# Patient Record
Sex: FEMALE | Race: WHITE | Employment: OTHER | URBAN - METROPOLITAN AREA
[De-identification: names, ages, dates, MRNs, and addresses within clinical notes are randomized per-mention and may not be internally consistent; named-entity substitution may affect disease eponyms.]

---

## 2020-06-11 PROBLEM — M81.0 SENILE OSTEOPOROSIS: Status: ACTIVE | Noted: 2020-06-11

## 2020-06-11 RX ORDER — SODIUM CHLORIDE 9 MG/ML
INJECTION, SOLUTION INTRAVENOUS CONTINUOUS
Status: CANCELLED | OUTPATIENT
Start: 2020-06-11

## 2020-06-11 RX ORDER — DIPHENHYDRAMINE HYDROCHLORIDE 50 MG/ML
50 INJECTION INTRAMUSCULAR; INTRAVENOUS ONCE
Status: CANCELLED | OUTPATIENT
Start: 2020-06-11

## 2020-06-11 RX ORDER — ZOLEDRONIC ACID 5 MG/100ML
5 INJECTION, SOLUTION INTRAVENOUS ONCE
Status: CANCELLED | OUTPATIENT
Start: 2020-06-11

## 2020-06-11 RX ORDER — EPINEPHRINE 1 MG/ML
0.3 INJECTION, SOLUTION, CONCENTRATE INTRAVENOUS PRN
Status: CANCELLED | OUTPATIENT
Start: 2020-06-11

## 2020-06-11 RX ORDER — HEPARIN SODIUM (PORCINE) LOCK FLUSH IV SOLN 100 UNIT/ML 100 UNIT/ML
500 SOLUTION INTRAVENOUS PRN
Status: CANCELLED | OUTPATIENT
Start: 2020-06-11

## 2020-06-11 RX ORDER — SODIUM CHLORIDE 0.9 % (FLUSH) 0.9 %
5 SYRINGE (ML) INJECTION PRN
Status: CANCELLED | OUTPATIENT
Start: 2020-06-11

## 2020-06-11 RX ORDER — SODIUM CHLORIDE 0.9 % (FLUSH) 0.9 %
10 SYRINGE (ML) INJECTION PRN
Status: CANCELLED | OUTPATIENT
Start: 2020-06-11

## 2020-06-11 RX ORDER — METHYLPREDNISOLONE SODIUM SUCCINATE 125 MG/2ML
125 INJECTION, POWDER, LYOPHILIZED, FOR SOLUTION INTRAMUSCULAR; INTRAVENOUS ONCE
Status: CANCELLED | OUTPATIENT
Start: 2020-06-11

## 2020-06-11 RX ORDER — SODIUM CHLORIDE 9 MG/ML
INJECTION, SOLUTION INTRAVENOUS CONTINUOUS
Status: CANCELLED
Start: 2020-06-11

## 2020-07-28 NOTE — PROGRESS NOTES
Called and spoke with patient instructed her we received and ok to use her labs for her infusion on Thursday so no additional lab work is needed and instructed her on hydrating well before she comes in 2 glasses water she verbalizes understanding. home

## 2020-07-28 NOTE — PROGRESS NOTES
Received a referral for Reclast along with labs. Called to hospital Pharmacist Shital Minor to have creatinine clearance calculated. Patient is 79years old, height 5'5\", weight 164lb , gender female, creatinine level 0.61. Pharmacist did calculation and I was told creatinine clearance is 56.

## 2020-07-30 ENCOUNTER — HOSPITAL ENCOUNTER (OUTPATIENT)
Dept: INFUSION THERAPY | Age: 80
Setting detail: INFUSION SERIES
Discharge: HOME | End: 2020-07-30
Payer: MEDICARE

## 2020-07-30 VITALS
WEIGHT: 162 LBS | HEIGHT: 65 IN | HEART RATE: 60 BPM | OXYGEN SATURATION: 93 % | RESPIRATION RATE: 16 BRPM | DIASTOLIC BLOOD PRESSURE: 65 MMHG | SYSTOLIC BLOOD PRESSURE: 124 MMHG | TEMPERATURE: 97.2 F | BODY MASS INDEX: 26.99 KG/M2

## 2020-07-30 DIAGNOSIS — M81.0 OSTEOPOROSIS, UNSPECIFIED OSTEOPOROSIS TYPE, UNSPECIFIED PATHOLOGICAL FRACTURE PRESENCE: Primary | ICD-10-CM

## 2020-07-30 PROCEDURE — 2580000003 HC RX 258: Performed by: INTERNAL MEDICINE

## 2020-07-30 PROCEDURE — 96365 THER/PROPH/DIAG IV INF INIT: CPT

## 2020-07-30 PROCEDURE — 6360000002 HC RX W HCPCS: Performed by: INTERNAL MEDICINE

## 2020-07-30 RX ORDER — SODIUM CHLORIDE 0.9 % (FLUSH) 0.9 %
10 SYRINGE (ML) INJECTION PRN
Status: CANCELLED | OUTPATIENT
Start: 2020-07-30

## 2020-07-30 RX ORDER — SODIUM CHLORIDE 9 MG/ML
INJECTION, SOLUTION INTRAVENOUS CONTINUOUS
Status: DISCONTINUED | OUTPATIENT
Start: 2020-07-30 | End: 2020-07-30 | Stop reason: ALTCHOICE

## 2020-07-30 RX ORDER — SODIUM CHLORIDE 9 MG/ML
INJECTION, SOLUTION INTRAVENOUS CONTINUOUS
Status: CANCELLED | OUTPATIENT
Start: 2020-07-30

## 2020-07-30 RX ORDER — EPINEPHRINE 1 MG/ML
0.3 INJECTION, SOLUTION, CONCENTRATE INTRAVENOUS PRN
Status: CANCELLED | OUTPATIENT
Start: 2020-07-30

## 2020-07-30 RX ORDER — ZOLEDRONIC ACID 5 MG/100ML
5 INJECTION, SOLUTION INTRAVENOUS ONCE
Status: CANCELLED | OUTPATIENT
Start: 2020-07-30

## 2020-07-30 RX ORDER — DIPHENHYDRAMINE HYDROCHLORIDE 50 MG/ML
50 INJECTION INTRAMUSCULAR; INTRAVENOUS ONCE
Status: CANCELLED | OUTPATIENT
Start: 2020-07-30

## 2020-07-30 RX ORDER — METHYLPREDNISOLONE SODIUM SUCCINATE 125 MG/2ML
125 INJECTION, POWDER, LYOPHILIZED, FOR SOLUTION INTRAMUSCULAR; INTRAVENOUS ONCE
Status: CANCELLED | OUTPATIENT
Start: 2020-07-30

## 2020-07-30 RX ORDER — SODIUM CHLORIDE 0.9 % (FLUSH) 0.9 %
5 SYRINGE (ML) INJECTION PRN
Status: CANCELLED | OUTPATIENT
Start: 2020-07-30

## 2020-07-30 RX ORDER — ZOLEDRONIC ACID 5 MG/100ML
5 INJECTION, SOLUTION INTRAVENOUS ONCE
Status: COMPLETED | OUTPATIENT
Start: 2020-07-30 | End: 2020-07-30

## 2020-07-30 RX ORDER — HEPARIN SODIUM (PORCINE) LOCK FLUSH IV SOLN 100 UNIT/ML 100 UNIT/ML
500 SOLUTION INTRAVENOUS PRN
Status: CANCELLED | OUTPATIENT
Start: 2020-07-30

## 2020-07-30 RX ORDER — SODIUM CHLORIDE 0.9 % (FLUSH) 0.9 %
10 SYRINGE (ML) INJECTION PRN
Status: DISCONTINUED | OUTPATIENT
Start: 2020-07-30 | End: 2020-07-30 | Stop reason: ALTCHOICE

## 2020-07-30 RX ORDER — SODIUM CHLORIDE 9 MG/ML
INJECTION, SOLUTION INTRAVENOUS CONTINUOUS
Status: CANCELLED
Start: 2020-07-30

## 2020-07-30 RX ADMIN — ZOLEDRONIC ACID 5 MG: 5 INJECTION, SOLUTION INTRAVENOUS at 10:31

## 2020-07-30 RX ADMIN — SODIUM CHLORIDE: 9 INJECTION, SOLUTION INTRAVENOUS at 10:30

## 2020-07-30 RX ADMIN — SODIUM CHLORIDE, PRESERVATIVE FREE 10 ML: 5 INJECTION INTRAVENOUS at 10:27

## 2020-07-30 RX ADMIN — SODIUM CHLORIDE, PRESERVATIVE FREE 10 ML: 5 INJECTION INTRAVENOUS at 10:50

## 2020-07-30 NOTE — PROGRESS NOTES
Tolerated infusion well. Therapy plan reviewed with patient. Verbalizes understanding. Reviewed AVS with patient, reviewed medication information, verbalizes good knowledge of current plan, and has no signs or symptoms to report at this time. Declines copy of AVS. VERBALIZES COMPLIANCE WITH HYDRATION BEFORE SHE CAME AND SHE WILL MAKE SURE TO DRINK WELL TODAY. DECLINES TO MAKE NEXT APPOINTMENT SAID SHE WILL SEE THE DOCTOR NEXT YEAR AND THEN RESCHEDULE.

## 2021-10-18 ENCOUNTER — APPOINTMENT (OUTPATIENT)
Dept: CT IMAGING | Age: 81
DRG: 069 | End: 2021-10-18
Payer: MEDICARE

## 2021-10-18 ENCOUNTER — HOSPITAL ENCOUNTER (INPATIENT)
Age: 81
LOS: 2 days | Discharge: HOME | DRG: 069 | End: 2021-10-21
Attending: STUDENT IN AN ORGANIZED HEALTH CARE EDUCATION/TRAINING PROGRAM | Admitting: INTERNAL MEDICINE
Payer: MEDICARE

## 2021-10-18 DIAGNOSIS — R41.0 CONFUSION: Primary | ICD-10-CM

## 2021-10-18 DIAGNOSIS — G45.9 TIA (TRANSIENT ISCHEMIC ATTACK): ICD-10-CM

## 2021-10-18 LAB
ALBUMIN SERPL-MCNC: 5 G/DL (ref 3.5–5.2)
ALP BLD-CCNC: 66 U/L (ref 35–104)
ALT SERPL-CCNC: 8 U/L (ref 0–32)
ANION GAP SERPL CALCULATED.3IONS-SCNC: 8 MMOL/L (ref 7–16)
AST SERPL-CCNC: 20 U/L (ref 0–31)
BACTERIA: ABNORMAL /HPF
BASOPHILS ABSOLUTE: 0.03 E9/L (ref 0–0.2)
BASOPHILS RELATIVE PERCENT: 0.5 % (ref 0–2)
BILIRUB SERPL-MCNC: 0.3 MG/DL (ref 0–1.2)
BILIRUBIN URINE: NEGATIVE
BLOOD, URINE: ABNORMAL
BUN BLDV-MCNC: 16 MG/DL (ref 6–23)
CALCIUM SERPL-MCNC: 9.7 MG/DL (ref 8.6–10.2)
CHLORIDE BLD-SCNC: 105 MMOL/L (ref 98–107)
CLARITY: CLEAR
CO2: 27 MMOL/L (ref 22–29)
COLOR: YELLOW
CREAT SERPL-MCNC: 0.7 MG/DL (ref 0.5–1)
EOSINOPHILS ABSOLUTE: 0.13 E9/L (ref 0.05–0.5)
EOSINOPHILS RELATIVE PERCENT: 2.1 % (ref 0–6)
GFR AFRICAN AMERICAN: >60
GFR NON-AFRICAN AMERICAN: >60 ML/MIN/1.73
GLUCOSE BLD-MCNC: 115 MG/DL (ref 74–99)
GLUCOSE URINE: NEGATIVE MG/DL
HCT VFR BLD CALC: 43.8 % (ref 34–48)
HEMOGLOBIN: 14 G/DL (ref 11.5–15.5)
IMMATURE GRANULOCYTES #: 0.02 E9/L
IMMATURE GRANULOCYTES %: 0.3 % (ref 0–5)
KETONES, URINE: NEGATIVE MG/DL
LEUKOCYTE ESTERASE, URINE: NEGATIVE
LYMPHOCYTES ABSOLUTE: 1.48 E9/L (ref 1.5–4)
LYMPHOCYTES RELATIVE PERCENT: 24.1 % (ref 20–42)
MAGNESIUM: 2 MG/DL (ref 1.6–2.6)
MCH RBC QN AUTO: 30.2 PG (ref 26–35)
MCHC RBC AUTO-ENTMCNC: 32 % (ref 32–34.5)
MCV RBC AUTO: 94.4 FL (ref 80–99.9)
MONOCYTES ABSOLUTE: 0.44 E9/L (ref 0.1–0.95)
MONOCYTES RELATIVE PERCENT: 7.2 % (ref 2–12)
NEUTROPHILS ABSOLUTE: 4.04 E9/L (ref 1.8–7.3)
NEUTROPHILS RELATIVE PERCENT: 65.8 % (ref 43–80)
NITRITE, URINE: NEGATIVE
PDW BLD-RTO: 14.4 FL (ref 11.5–15)
PH UA: 6 (ref 5–9)
PLATELET # BLD: 148 E9/L (ref 130–450)
PMV BLD AUTO: 10.3 FL (ref 7–12)
POTASSIUM SERPL-SCNC: 4.2 MMOL/L (ref 3.5–5)
PROTEIN UA: NEGATIVE MG/DL
RBC # BLD: 4.64 E12/L (ref 3.5–5.5)
RBC UA: ABNORMAL /HPF (ref 0–2)
SODIUM BLD-SCNC: 140 MMOL/L (ref 132–146)
SPECIFIC GRAVITY UA: <=1.005 (ref 1–1.03)
TOTAL PROTEIN: 7.4 G/DL (ref 6.4–8.3)
TROPONIN, HIGH SENSITIVITY: 10 NG/L (ref 0–9)
UROBILINOGEN, URINE: 0.2 E.U./DL
WBC # BLD: 6.1 E9/L (ref 4.5–11.5)
WBC UA: ABNORMAL /HPF (ref 0–5)

## 2021-10-18 PROCEDURE — 80053 COMPREHEN METABOLIC PANEL: CPT

## 2021-10-18 PROCEDURE — 99285 EMERGENCY DEPT VISIT HI MDM: CPT

## 2021-10-18 PROCEDURE — 84484 ASSAY OF TROPONIN QUANT: CPT

## 2021-10-18 PROCEDURE — 85025 COMPLETE CBC W/AUTO DIFF WBC: CPT

## 2021-10-18 PROCEDURE — 36415 COLL VENOUS BLD VENIPUNCTURE: CPT

## 2021-10-18 PROCEDURE — 83735 ASSAY OF MAGNESIUM: CPT

## 2021-10-18 PROCEDURE — 93005 ELECTROCARDIOGRAM TRACING: CPT | Performed by: STUDENT IN AN ORGANIZED HEALTH CARE EDUCATION/TRAINING PROGRAM

## 2021-10-18 PROCEDURE — 81001 URINALYSIS AUTO W/SCOPE: CPT

## 2021-10-18 PROCEDURE — 70498 CT ANGIOGRAPHY NECK: CPT

## 2021-10-18 PROCEDURE — 70496 CT ANGIOGRAPHY HEAD: CPT

## 2021-10-18 RX ORDER — SODIUM CHLORIDE 0.9 % (FLUSH) 0.9 %
10 SYRINGE (ML) INJECTION PRN
Status: COMPLETED | OUTPATIENT
Start: 2021-10-18 | End: 2021-10-19

## 2021-10-18 RX ORDER — SODIUM CHLORIDE 0.9 % (FLUSH) 0.9 %
5-40 SYRINGE (ML) INJECTION 2 TIMES DAILY
Status: DISCONTINUED | OUTPATIENT
Start: 2021-10-18 | End: 2021-10-18

## 2021-10-19 PROBLEM — F32.A ANXIETY AND DEPRESSION: Status: ACTIVE | Noted: 2021-10-19

## 2021-10-19 PROBLEM — G45.9 TIA (TRANSIENT ISCHEMIC ATTACK): Status: ACTIVE | Noted: 2021-10-19

## 2021-10-19 PROBLEM — F41.9 ANXIETY AND DEPRESSION: Status: ACTIVE | Noted: 2021-10-19

## 2021-10-19 PROBLEM — Z86.79 HISTORY OF ATRIAL FIBRILLATION: Chronic | Status: ACTIVE | Noted: 2021-10-19

## 2021-10-19 PROBLEM — I48.91 ATRIAL FIBRILLATION (HCC): Status: ACTIVE | Noted: 2021-10-19

## 2021-10-19 PROBLEM — E55.9 VITAMIN D DEFICIENCY: Status: ACTIVE | Noted: 2021-10-19

## 2021-10-19 PROBLEM — I66.09 MIDDLE CEREBRAL ARTERY STENOSIS: Status: ACTIVE | Noted: 2021-10-19

## 2021-10-19 PROBLEM — Z95.5 HISTORY OF CORONARY ARTERY STENT PLACEMENT: Chronic | Status: ACTIVE | Noted: 2021-10-19

## 2021-10-19 LAB
ANION GAP SERPL CALCULATED.3IONS-SCNC: 15 MMOL/L (ref 7–16)
BASOPHILS ABSOLUTE: 0.04 E9/L (ref 0–0.2)
BASOPHILS RELATIVE PERCENT: 0.7 % (ref 0–2)
BUN BLDV-MCNC: 11 MG/DL (ref 6–23)
CALCIUM SERPL-MCNC: 8.8 MG/DL (ref 8.6–10.2)
CHLORIDE BLD-SCNC: 108 MMOL/L (ref 98–107)
CHOLESTEROL, TOTAL: 228 MG/DL (ref 0–199)
CO2: 20 MMOL/L (ref 22–29)
CREAT SERPL-MCNC: 0.5 MG/DL (ref 0.5–1)
EKG ATRIAL RATE: 84 BPM
EKG P-R INTERVAL: 234 MS
EKG Q-T INTERVAL: 418 MS
EKG QRS DURATION: 92 MS
EKG QTC CALCULATION (BAZETT): 431 MS
EKG R AXIS: 14 DEGREES
EKG T AXIS: 38 DEGREES
EKG VENTRICULAR RATE: 64 BPM
EOSINOPHILS ABSOLUTE: 0.1 E9/L (ref 0.05–0.5)
EOSINOPHILS RELATIVE PERCENT: 1.7 % (ref 0–6)
GFR AFRICAN AMERICAN: >60
GFR NON-AFRICAN AMERICAN: >60 ML/MIN/1.73
GLUCOSE BLD-MCNC: 176 MG/DL (ref 74–99)
HBA1C MFR BLD: 5.7 % (ref 4–5.6)
HCT VFR BLD CALC: 41.3 % (ref 34–48)
HDLC SERPL-MCNC: 54 MG/DL
HEMOGLOBIN: 13.2 G/DL (ref 11.5–15.5)
IMMATURE GRANULOCYTES #: 0.02 E9/L
IMMATURE GRANULOCYTES %: 0.3 % (ref 0–5)
LDL CHOLESTEROL CALCULATED: 148 MG/DL (ref 0–99)
LYMPHOCYTES ABSOLUTE: 1.41 E9/L (ref 1.5–4)
LYMPHOCYTES RELATIVE PERCENT: 23.6 % (ref 20–42)
MCH RBC QN AUTO: 30 PG (ref 26–35)
MCHC RBC AUTO-ENTMCNC: 32 % (ref 32–34.5)
MCV RBC AUTO: 93.9 FL (ref 80–99.9)
MONOCYTES ABSOLUTE: 0.36 E9/L (ref 0.1–0.95)
MONOCYTES RELATIVE PERCENT: 6 % (ref 2–12)
NEUTROPHILS ABSOLUTE: 4.05 E9/L (ref 1.8–7.3)
NEUTROPHILS RELATIVE PERCENT: 67.7 % (ref 43–80)
PDW BLD-RTO: 14.6 FL (ref 11.5–15)
PLATELET # BLD: 144 E9/L (ref 130–450)
PMV BLD AUTO: 10.8 FL (ref 7–12)
POTASSIUM REFLEX MAGNESIUM: 4.1 MMOL/L (ref 3.5–5)
RBC # BLD: 4.4 E12/L (ref 3.5–5.5)
SODIUM BLD-SCNC: 143 MMOL/L (ref 132–146)
TRIGL SERPL-MCNC: 131 MG/DL (ref 0–149)
TROPONIN, HIGH SENSITIVITY: 8 NG/L (ref 0–9)
TROPONIN, HIGH SENSITIVITY: 8 NG/L (ref 0–9)
VLDLC SERPL CALC-MCNC: 26 MG/DL
WBC # BLD: 6 E9/L (ref 4.5–11.5)

## 2021-10-19 PROCEDURE — 6370000000 HC RX 637 (ALT 250 FOR IP): Performed by: STUDENT IN AN ORGANIZED HEALTH CARE EDUCATION/TRAINING PROGRAM

## 2021-10-19 PROCEDURE — G0378 HOSPITAL OBSERVATION PER HR: HCPCS

## 2021-10-19 PROCEDURE — 97165 OT EVAL LOW COMPLEX 30 MIN: CPT

## 2021-10-19 PROCEDURE — 6370000000 HC RX 637 (ALT 250 FOR IP): Performed by: INTERNAL MEDICINE

## 2021-10-19 PROCEDURE — 70496 CT ANGIOGRAPHY HEAD: CPT

## 2021-10-19 PROCEDURE — 6360000004 HC RX CONTRAST MEDICATION: Performed by: STUDENT IN AN ORGANIZED HEALTH CARE EDUCATION/TRAINING PROGRAM

## 2021-10-19 PROCEDURE — 36415 COLL VENOUS BLD VENIPUNCTURE: CPT

## 2021-10-19 PROCEDURE — 99222 1ST HOSP IP/OBS MODERATE 55: CPT | Performed by: PSYCHIATRY & NEUROLOGY

## 2021-10-19 PROCEDURE — 80048 BASIC METABOLIC PNL TOTAL CA: CPT

## 2021-10-19 PROCEDURE — 6370000000 HC RX 637 (ALT 250 FOR IP): Performed by: NURSE PRACTITIONER

## 2021-10-19 PROCEDURE — 85025 COMPLETE CBC W/AUTO DIFF WBC: CPT

## 2021-10-19 PROCEDURE — 2580000003 HC RX 258: Performed by: NURSE PRACTITIONER

## 2021-10-19 PROCEDURE — 84484 ASSAY OF TROPONIN QUANT: CPT

## 2021-10-19 PROCEDURE — 2580000003 HC RX 258: Performed by: STUDENT IN AN ORGANIZED HEALTH CARE EDUCATION/TRAINING PROGRAM

## 2021-10-19 PROCEDURE — 1200000000 HC SEMI PRIVATE

## 2021-10-19 PROCEDURE — 80061 LIPID PANEL: CPT

## 2021-10-19 PROCEDURE — 97161 PT EVAL LOW COMPLEX 20 MIN: CPT

## 2021-10-19 PROCEDURE — 70498 CT ANGIOGRAPHY NECK: CPT

## 2021-10-19 PROCEDURE — 93010 ELECTROCARDIOGRAM REPORT: CPT | Performed by: INTERNAL MEDICINE

## 2021-10-19 PROCEDURE — 97530 THERAPEUTIC ACTIVITIES: CPT

## 2021-10-19 PROCEDURE — 83036 HEMOGLOBIN GLYCOSYLATED A1C: CPT

## 2021-10-19 PROCEDURE — 6370000000 HC RX 637 (ALT 250 FOR IP): Performed by: EMERGENCY MEDICINE

## 2021-10-19 RX ORDER — ASPIRIN 81 MG/1
81 TABLET, CHEWABLE ORAL DAILY
Status: DISCONTINUED | OUTPATIENT
Start: 2021-10-19 | End: 2021-10-19

## 2021-10-19 RX ORDER — ACETAMINOPHEN 325 MG/1
650 TABLET ORAL EVERY 6 HOURS PRN
Status: DISCONTINUED | OUTPATIENT
Start: 2021-10-19 | End: 2021-10-21 | Stop reason: HOSPADM

## 2021-10-19 RX ORDER — ACETAMINOPHEN 650 MG/1
650 SUPPOSITORY RECTAL EVERY 6 HOURS PRN
Status: DISCONTINUED | OUTPATIENT
Start: 2021-10-19 | End: 2021-10-21 | Stop reason: HOSPADM

## 2021-10-19 RX ORDER — VITAMIN B COMPLEX
1000 TABLET ORAL DAILY
Status: DISCONTINUED | OUTPATIENT
Start: 2021-10-19 | End: 2021-10-21 | Stop reason: HOSPADM

## 2021-10-19 RX ORDER — CLOPIDOGREL BISULFATE 75 MG/1
300 TABLET ORAL ONCE
Status: COMPLETED | OUTPATIENT
Start: 2021-10-19 | End: 2021-10-19

## 2021-10-19 RX ORDER — POLYETHYLENE GLYCOL 3350 17 G/17G
17 POWDER, FOR SOLUTION ORAL DAILY PRN
Status: DISCONTINUED | OUTPATIENT
Start: 2021-10-19 | End: 2021-10-21 | Stop reason: HOSPADM

## 2021-10-19 RX ORDER — SODIUM CHLORIDE 0.9 % (FLUSH) 0.9 %
5-40 SYRINGE (ML) INJECTION PRN
Status: DISCONTINUED | OUTPATIENT
Start: 2021-10-19 | End: 2021-10-21 | Stop reason: HOSPADM

## 2021-10-19 RX ORDER — EZETIMIBE 10 MG/1
10 TABLET ORAL NIGHTLY
Status: DISCONTINUED | OUTPATIENT
Start: 2021-10-19 | End: 2021-10-21 | Stop reason: HOSPADM

## 2021-10-19 RX ORDER — ASPIRIN 81 MG/1
81 TABLET, CHEWABLE ORAL DAILY
Status: DISCONTINUED | OUTPATIENT
Start: 2021-10-20 | End: 2021-10-19

## 2021-10-19 RX ORDER — CALCIUM CARBONATE 200(500)MG
500 TABLET,CHEWABLE ORAL DAILY
Status: DISCONTINUED | OUTPATIENT
Start: 2021-10-19 | End: 2021-10-21 | Stop reason: HOSPADM

## 2021-10-19 RX ORDER — SODIUM CHLORIDE 0.9 % (FLUSH) 0.9 %
5-40 SYRINGE (ML) INJECTION EVERY 12 HOURS SCHEDULED
Status: DISCONTINUED | OUTPATIENT
Start: 2021-10-19 | End: 2021-10-21 | Stop reason: HOSPADM

## 2021-10-19 RX ORDER — DOFETILIDE 0.12 MG/1
125 CAPSULE ORAL 2 TIMES DAILY
Status: DISCONTINUED | OUTPATIENT
Start: 2021-10-19 | End: 2021-10-21 | Stop reason: HOSPADM

## 2021-10-19 RX ORDER — ASPIRIN 325 MG
325 TABLET ORAL ONCE
Status: COMPLETED | OUTPATIENT
Start: 2021-10-19 | End: 2021-10-19

## 2021-10-19 RX ORDER — SODIUM CHLORIDE 9 MG/ML
25 INJECTION, SOLUTION INTRAVENOUS PRN
Status: DISCONTINUED | OUTPATIENT
Start: 2021-10-19 | End: 2021-10-21 | Stop reason: HOSPADM

## 2021-10-19 RX ORDER — ACETAMINOPHEN 500 MG
1000 TABLET ORAL ONCE
Status: COMPLETED | OUTPATIENT
Start: 2021-10-19 | End: 2021-10-19

## 2021-10-19 RX ORDER — VENLAFAXINE HYDROCHLORIDE 37.5 MG/1
37.5 CAPSULE, EXTENDED RELEASE ORAL DAILY
Status: DISCONTINUED | OUTPATIENT
Start: 2021-10-19 | End: 2021-10-21 | Stop reason: HOSPADM

## 2021-10-19 RX ORDER — PROCHLORPERAZINE EDISYLATE 5 MG/ML
5 INJECTION INTRAMUSCULAR; INTRAVENOUS EVERY 6 HOURS PRN
Status: DISCONTINUED | OUTPATIENT
Start: 2021-10-19 | End: 2021-10-21 | Stop reason: HOSPADM

## 2021-10-19 RX ORDER — ROSUVASTATIN CALCIUM 5 MG/1
5 TABLET, COATED ORAL NIGHTLY
Status: DISCONTINUED | OUTPATIENT
Start: 2021-10-19 | End: 2021-10-21 | Stop reason: HOSPADM

## 2021-10-19 RX ADMIN — Medication 5 ML: at 21:05

## 2021-10-19 RX ADMIN — CLOPIDOGREL BISULFATE 300 MG: 75 TABLET ORAL at 02:03

## 2021-10-19 RX ADMIN — VENLAFAXINE HYDROCHLORIDE 37.5 MG: 37.5 CAPSULE, EXTENDED RELEASE ORAL at 18:22

## 2021-10-19 RX ADMIN — SODIUM CHLORIDE, PRESERVATIVE FREE 10 ML: 5 INJECTION INTRAVENOUS at 00:10

## 2021-10-19 RX ADMIN — Medication 10 ML: at 13:03

## 2021-10-19 RX ADMIN — DOFETILIDE 125 MCG: 0.12 CAPSULE ORAL at 21:04

## 2021-10-19 RX ADMIN — EZETIMIBE 10 MG: 10 TABLET ORAL at 21:04

## 2021-10-19 RX ADMIN — IOPAMIDOL 75 ML: 755 INJECTION, SOLUTION INTRAVENOUS at 00:10

## 2021-10-19 RX ADMIN — DOFETILIDE 125 MCG: 0.12 CAPSULE ORAL at 13:03

## 2021-10-19 RX ADMIN — ROSUVASTATIN CALCIUM 5 MG: 5 TABLET, FILM COATED ORAL at 21:43

## 2021-10-19 RX ADMIN — CALCIUM CARBONATE 500 MG: 500 TABLET, CHEWABLE ORAL at 13:03

## 2021-10-19 RX ADMIN — Medication 1000 UNITS: at 13:03

## 2021-10-19 RX ADMIN — ASPIRIN 325 MG: 325 TABLET ORAL at 01:47

## 2021-10-19 RX ADMIN — ACETAMINOPHEN 1000 MG: 500 TABLET ORAL at 06:28

## 2021-10-19 RX ADMIN — APIXABAN 5 MG: 5 TABLET, FILM COATED ORAL at 21:04

## 2021-10-19 ASSESSMENT — PAIN SCALES - GENERAL
PAINLEVEL_OUTOF10: 0
PAINLEVEL_OUTOF10: 4

## 2021-10-19 NOTE — PROGRESS NOTES
Physical Therapy  Physical Therapy Initial Assessment     Name: Refugio Villar  : 1940  MRN: [id number]      Date of Service: 10/19/2021    Evaluating PT:  Serafina Brittle, PT, DPT  [id number]     Room #:  8957/8457-F  Diagnosis:  TIA (transient ischemic attack) [G45.9]  Confusion [R41.0]  PMHx/PSHx:  OA, Hyperlipidemia  Procedure/Surgery:  N/A  Precautions:  Falls, Needs : Alvaro  Equipment Needs:  TBD    SUBJECTIVE:    Pt lives with son in a 2 story home with 3 or 6 stairs to enter and one rail each. Bedroom and bathroom are on the 1st level. Pt ambulated with no AD PTA and was independent in all ADL. Family reports daughter/son-in-law live <10 minutes away and are able/willing to assist pt prn. OBJECTIVE:   Initial Evaluation  Date: 10/19 Treatment Short Term/ Long Term   Goals   AM-PAC 6 Clicks 36/83     Was pt agreeable to Eval/treatment? yes     Does pt have pain? Headache, unspecified     Bed Mobility  Rolling: SBA  Supine to sit: SBA  Sit to supine: SBA  Scooting: SBA  Rolling: Independent  Supine to sit: Independent  Sit to supine: Independent  Scooting: Independent   Transfers Sit to stand: SBA  Stand to sit: SBA  Stand pivot: NT  Sit to stand: Independent  Stand to sit:  Independent  Stand pivot: Independent   Ambulation   2x150 feet with no AD SBA  400 feet Supervision   Stair negotiation: ascended and descended  4 steps with 1 rail SBA  8 steps with 1 rail supervision   ROM BUE:  Per OT eval  BLE:  Full AROM     Strength BUE:  Per OT eval   BLE:  4/5 overall  Improve to 5/5 overall   Balance Sitting EOB:  SBA  Dynamic Standing:  SBA  Sitting EOB:  Independent  Dynamic Standing:  Independent     Pt is A & O x 3, limited due to possible expressive aphasia and language barrier  Sensation:  Pt denies numbness and tingling to extremities  Edema:  Unremarkable     Therapeutic Exercises:    Functional mobility    Patient education  Pt educated on PT role, safety during functional mobility, and prognosis of current illness. Patient response to education:   Pt verbalized understanding Pt demonstrated skill Pt requires further education in this area   yes yes reinforcement     ASSESSMENT:  Conditions Requiring Skilled Therapeutic Intervention:    []Decreased strength     []Decreased ROM  [x]Decreased functional mobility  [x]Decreased balance   [x]Decreased endurance   []Decreased posture  []Decreased sensation  [x]Decreased coordination   []Decreased vision  []Decreased safety awareness   []Increased pain       Comments:  Pt received recumbent in bed and agreeable to PT evaluation with OT collaboration. Son in law in room for visitation able to assist with translation for evaluation - no  used. Pt was difficult historian due to potential aphasia and language barrier. Pt supine<>sit EOB with light physical assist for safety only. Sit<>stand without AD or physical assistance outside of safety. Pt ambulated without AD into hallway SBA with shortened gait pattern, requiring verbal cues to guide back to room. Stair negotiation of 4 steps using one railing SBA no AD using step to pattern. Pt left with call button in reach, lines attached, and needs met.       Treatment:  Patient practiced and was instructed in the following treatment:     Bed mobility training - pt given verbal and tactile cues to facilitate proper sequencing and safety during rolling and supine>sit as well as provided with physical assistance to complete task    Sitting EOB for >2 minutes for upright tolerance, postural awareness and BLE ROM   STS transfer training - pt educated on proper hand and foot placement, safety and sequencing to safely complete sit<>stand transfers with hands on assistance to complete task safely    Gait training- pt was given verbal and tactile cues to facilitate safety/balance, navigation of enviornment during ambulation as well as provided with physical assistance to complete task.   Stair training - Pt educated on proper safety and sequencing during stair ascension and descension. Verbal cues were given to facilitate safety/balance and hands on assistance provided for balance and fall prevention. Pt's/ family goals   1. Return to son's home    Prognosis is good for reaching above PT goals. Patient and or family understand(s) diagnosis, prognosis, and plan of care. yes    PHYSICAL THERAPY PLAN OF CARE:    PT POC is established based on physician order and patient diagnosis     Referring provider/PT Order:    10/19/21 0845  PT eval and treat     April DELIA Stout - CNP       Diagnosis:  TIA (transient ischemic attack) [G45.9]  Confusion [R41.0]  Specific instructions for next treatment:  Progress transfer training, ambulation, and stair negotiation training. Current Treatment Recommendations:     [x] Strengthening to improve independence with functional mobility   [] ROM to improve independence with functional mobility   [x] Balance Training to improve static/dynamic balance and to reduce fall risk  [x] Endurance Training to improve activity tolerance during functional mobility   [x] Transfer Training to improve safety and independence with all functional transfers   [x] Gait Training to improve gait mechanics, endurance and asses need for appropriate assistive device  [x] Stair Training in preparation for safe discharge home and/or into the community   [] Positioning to prevent skin breakdown and contractures  [] Safety and Education Training   [] Patient/Caregiver Education   [] HEP  [] Other     PT long term treatment goals are located in above grid    Frequency of treatments: 2-5x/week x 1-2 weeks.     Time in  1030  Time out  1100    Total Treatment Time  15 minutes     Evaluation Time includes thorough review of current medical information, gathering information on past medical history/social history and prior level of function, completion of standardized

## 2021-10-19 NOTE — PROGRESS NOTES
Called and left voicemail for Dr. Buffy Graves regarding  a medication. Nikki Hammond wants to know why the pt is on 2.5 mg Eliquis. Awaiting call back.

## 2021-10-19 NOTE — ED PROVIDER NOTES
Department of Emergency Medicine   ED  Provider Note  Admit Date/RoomTime: 10/18/2021  9:54 PM  ED Room: 03/03          History of Present Illness:  10/18/21, Time: 11:36 PM EDT  Chief Complaint   Patient presents with    Altered Mental Status     family Yasmeen Reveles and son in law) went to house at 2000-  pt is AO X1 (Unusual)  last known well Saturday       Ann Washington is a 79 y.o. female presenting to the ED for Brief episode of confusion. Family reports this was at 8 PM.  It resolved quickly. It was completely resolved by the time the arrived to their mother's house. At present, the patient states that she feels her normal baseline state of health. Additionally, earlier in the day the patient notes an episode where she had trouble opening and closing her right eye. She states it was only her eye. She did not try to talk at this time. She denied any associated headache, nausea or vomiting or slurred speech at the time. Denies history of TIA or CVA in the past.  Patient is currently on anticoagulation with Eliquis for history of atrial fibrillation. Nothing makes her symptoms better or worse. At present, the patient is without complaint. Additionally, they note that the patient when they were speaking with her on the phone seem to not be speaking properly and they were having difficulty understanding her as well. The patient does not speak good [place]. However, they were speaking with her in her native language.     Review of Systems:  Constitutional: Denies fevers  Eyes: Denies blurry vision  ENT: Denies sore throat  Cardiovascular: Denies chest pain  Respiratory: Denies shortness of breath  Gastrointestinal: Denies abdominal pain  Genitourinary: Denies dysuria  Musculoskeletal: Denies myalgias  Integumentary: Denies rashes  Neurological: Denies headache    --------------------------------------------- PAST HISTORY ---------------------------------------------  Past Medical History:  has a past medical history of Arthritis, Heart attack ([facility] [address] and Hyperlipidemia. Past Surgical History:  has a past surgical history that includes Hysterectomy (9/26/79); Arm Debridement (1998); Mitral valve replacement (2008); Appendectomy (2009); pacemaker placement (2008); and shoulder surgery. Social History:  reports that she has never smoked. She has never used smokeless tobacco. She reports that she does not drink alcohol and does not use drugs. Family History: family history includes Breast Cancer in her sister. . Unless otherwise noted, family history is non contributory    The patients home medications have been reviewed. Allergies: Latex, Lipitor [atorvastatin], Tricor [fenofibrate], and Zocor [simvastatin]    I have reviewed the past medical history, past surgical history, social history, and family history    ---------------------------------------------------PHYSICAL EXAM--------------------------------------    Constitutional: Appears in no distress  Head: Normocephalic, atraumatic  Eyes: Non-icteric slcera, no conjunctival injection  ENT: Moist mucous membranes,  Neck: Trachea midline, no JVD  Respiratory: Nonlabored respirations. Lungs clear to auscultation bilaterally, no wheezes, rales, or rhonchi. Cardiovascular: Regular rate. Regular rhythm. No murmurs, no gallops, no rubs. Gastrointestinal: Abdomen Soft, Non tender, Non distended. No rebound tenderness, guarding, or rigidity. Extremities: No lower extremity edema  Genitourinary: No CVA tenderness, no suprapubic tenderness  Musculoskeletal: Moves all extremities, no deformity  Skin: Pink, warm, dry without rash. Neurologic: Alert, symmetric facies, no aphasia, neurologic: Pupils are equal and reactive to light. Extraocular eye movements intact. Sensation intact bilaterally. Symmetric facies. Tongue protrudes midline. No meningismus. 5 out of 5 symmetric strength of the upper and lower extremities.   Finger to nose testing is within normal limits. The patient has a normal gait. Alert and oriented. -------------------------------------------------- RESULTS -------------------------------------------------  I have personally reviewed all laboratory and imaging results for this patient. Results are listed below.      LABS: (Lab results interpreted by me)  Results for orders placed or performed during the hospital encounter of 10/18/21   Urinalysis with Microscopic   Result Value Ref Range    Color, UA Yellow Straw/Yellow    Clarity, UA Clear Clear    Glucose, Ur Negative Negative mg/dL    Bilirubin Urine Negative Negative    Ketones, Urine Negative Negative mg/dL    Specific Gravity, UA <=1.005 1.005 - 1.030    Blood, Urine SMALL (A) Negative    pH, UA 6.0 5.0 - 9.0    Protein, UA Negative Negative mg/dL    Urobilinogen, Urine 0.2 <2.0 E.U./dL    Nitrite, Urine Negative Negative    Leukocyte Esterase, Urine Negative Negative    WBC, UA NONE 0 - 5 /HPF    RBC, UA 0-1 0 - 2 /HPF    Bacteria, UA NONE SEEN None Seen /HPF   CBC Auto Differential   Result Value Ref Range    WBC 6.1 4.5 - 11.5 E9/L    RBC 4.64 3.50 - 5.50 E12/L    Hemoglobin 14.0 11.5 - 15.5 g/dL    Hematocrit 43.8 34.0 - 48.0 %    MCV 94.4 80.0 - 99.9 fL    MCH 30.2 26.0 - 35.0 pg    MCHC 32.0 32.0 - 34.5 %    RDW 14.4 11.5 - 15.0 fL    Platelets 971 940 - 326 E9/L    MPV 10.3 7.0 - 12.0 fL    Neutrophils % 65.8 43.0 - 80.0 %    Immature Granulocytes % 0.3 0.0 - 5.0 %    Lymphocytes % 24.1 20.0 - 42.0 %    Monocytes % 7.2 2.0 - 12.0 %    Eosinophils % 2.1 0.0 - 6.0 %    Basophils % 0.5 0.0 - 2.0 %    Neutrophils Absolute 4.04 1.80 - 7.30 E9/L    Immature Granulocytes # 0.02 E9/L    Lymphocytes Absolute 1.48 (L) 1.50 - 4.00 E9/L    Monocytes Absolute 0.44 0.10 - 0.95 E9/L    Eosinophils Absolute 0.13 0.05 - 0.50 E9/L    Basophils Absolute 0.03 0.00 - 0.20 E9/L   Comprehensive Metabolic Panel   Result Value Ref Range    Sodium 140 132 - 146 mmol/L    Potassium 4.2 3.5 - 5.0 mmol/L    Chloride 105 98 - 107 mmol/L    CO2 27 22 - 29 mmol/L    Anion Gap 8 7 - 16 mmol/L    Glucose 115 (H) 74 - 99 mg/dL    BUN 16 6 - 23 mg/dL    CREATININE 0.7 0.5 - 1.0 mg/dL    GFR Non-African American >60 >=60 mL/min/1.73    GFR African American >60     Calcium 9.7 8.6 - 10.2 mg/dL    Total Protein 7.4 6.4 - 8.3 g/dL    Albumin 5.0 3.5 - 5.2 g/dL    Total Bilirubin 0.3 0.0 - 1.2 mg/dL    Alkaline Phosphatase 66 35 - 104 U/L    ALT 8 0 - 32 U/L    AST 20 0 - 31 U/L   Troponin   Result Value Ref Range    Troponin, High Sensitivity 10 (H) 0 - 9 ng/L   Troponin   Result Value Ref Range    Troponin, High Sensitivity 8 0 - 9 ng/L   Magnesium   Result Value Ref Range    Magnesium 2.0 1.6 - 2.6 mg/dL   EKG 12 Lead   Result Value Ref Range    Ventricular Rate 64 BPM    Atrial Rate 84 BPM    P-R Interval 234 ms    QRS Duration 92 ms    Q-T Interval 418 ms    QTc Calculation (Bazett) 431 ms    R Axis 14 degrees    T Axis 38 degrees   ,       RADIOLOGY:  Interpreted by Radiologist unless otherwise specified  CTA HEAD W CONTRAST   Preliminary Result   1. No acute intracranial hemorrhage or mass effect. 2. 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 Focal calcified atherosclerotic plaque in the left middle   cerebral artery M1 segment. Otherwise, the bilateral middle cerebral artery   branches are patent. 3. No hemodynamically significant stenosis in the bilateral cervical internal   carotid arteries per NASCET criteria. Bilateral carotid bulb atherosclerotic   plaque. 4. Patent bilateral vertebral arteries. Follow-up brain MRI and/or conventional catheter angiogram may be helpful for   further evaluation. CTA NECK W CONTRAST   Preliminary Result   1. No acute intracranial hemorrhage or mass effect. 2. 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   Focal calcified atherosclerotic plaque in the left middle cerebral artery M1 segment. Otherwise, the bilateral middle cerebral artery   branches are patent. 3. No hemodynamically significant stenosis in the bilateral cervical internal   carotid arteries per NASCET criteria. Bilateral carotid bulb atherosclerotic   plaque. 4. Patent bilateral vertebral arteries. Follow-up brain MRI and/or conventional catheter angiogram may be helpful for   further evaluation.               ------------------------- NURSING NOTES AND VITALS REVIEWED ---------------------------   The nursing notes within the ED encounter and vital signs as below have been reviewed by myself  /88   Pulse 95   Temp 97.1 °F (36.2 °C) (Temporal)   Resp 16   LMP 09/10/1979   SpO2 97%     Oxygen Saturation Interpretation: Normal    The patients available past medical records and past encounters were reviewed. ------------------------------ ED COURSE/MEDICAL DECISION MAKING----------------------  Medications   aspirin tablet 325 mg (has no administration in time range)   iopamidol (ISOVUE-370) 76 % injection 75 mL (75 mLs IntraVENous Given 10/19/21 0010)   sodium chloride flush 0.9 % injection 10 mL (10 mLs IntraVENous Given 10/19/21 0010)       This patient's ED course included:a personal history and physicial examination and re-evaluation prior to disposition    This patient has remained hemodynamically stable during their ED course. Consultations:  Internal Medicine    Medical Decision Making:   Patient presents emergency department with episode of confusion as well as difficulty opening and closing her right eye for about 1/2-hour between 230 and 3 PM.    Vital signs are reviewed and interpreted by myself as within normal acceptable limits. History and physical examination finding consistent with multiple differential diagnosis considered including TIA, electrolyte abnormalities, ACS, urinary tract infection.   Work-up is initiated for this including a head CT and CTA head and neck.    EKG:  EKG is interpreted by myself as ventricular rate of 64 bpm there is a atrial paced rhythm with a pacer spike for every QRS complex. Castration is within normal limits at 92 ms QT/QTc is within normal limits at 418/431 ms. No ST segment elevations or depressions. No T wave inversions that would be concerning for acute ischemia or infarct. This tracing is interpreted by myself as atrial paced rhythm. Labs: Reviewed and within normal acceptable limits. Urinalysis negative for urinary tract infection. Headache, nasal congestion labs: Lab studies are unremarkable per    Imaging: CTA head and neck remarkable for high-grade stenosis bilateral MCA M1 segments. This may be chronic. Given the patient's symptomatology as well as altered mentation episode as well as inability to close her right eye for about 1/2-hour I feel this is concerning for TIA. We will transfer the [facility] to [facility] for neurologic evaluation. She is given aspirin here. Counseling: The emergency provider has spoken with the patient and discussed todays results, in addition to providing specific details for the plan of care and counseling regarding the diagnosis and prognosis. Questions are answered at this time and they are agreeable with the plan.     --------------------------------- IMPRESSION AND DISPOSITION ---------------------------------    IMPRESSION  1. Confusion    2. TIA (transient ischemic attack)        DISPOSITION  Disposition: Transfer to [place] [address]  Patient condition is stable    IDr. Eleazar, am the primary provider of record    Eleazar Barbosa DO  Emergency Medicine    NOTE: This report was transcribed using voice recognition software.  Every effort was made to ensure accuracy; however, inadvertent computerized transcription errors may be present         Kt Stevens, DO  10/19/21 1 [facility] Roger Madrigal, DO  10/19/21 Dwayne Connolly DO  10/19/21 0159

## 2021-10-19 NOTE — CONSULTS
[address]  Neurology Consult    Date:  10/19/2021  Patient Name:  Jessica Munguia  YOB: 1940  MRN: [id number]     PCP:  Terell Knight MD   Referring:  No ref. provider found      Chief Complaint: AMS , [facility] Saturday 10/16/21    History obtained from: EMR, patient     Assessment  Jessica Munguia is a 79 y.o.  female with PMHx of arthritis, HLD, Afib (since 2015, on eliquis), mitral regurgitation s/[ mitral valve repair in 2008 with subsequent permanent pacemaker insertion presenting for evaluation of confusion/AMS. Neurology consulted for TIA/ stenosis in CTA Head/Neck. Atrial fibrillation:[id number] Score: 5    Review of history, physical and imaging indicate this may be a TIA 2/2 high grade stenosis vs cardioembolic in origin. ABCD2 score 3 ( Low risk) . Imaging showed high-grade stenosis of the bilateral middle cerebral artery M1 segments may be chronic. Focal calcified atherosclerotic plaque in the left middlecerebral artery M1 segment. Otherwise, the bilateral middle cerebral artery branches are patent. No hemodynamically significant stenosis of carotid arteries in neck but bilateral carotid bulb atherosclerotic plaque noted. Plan  MRI cannot be done due to pacemaker    , would recommend aggressive LDL reduction due to significant atherosclerotic cerebrovascular disease. Previous concerns with myalgia noted as per family but will still recommend zetia and crestor to start now and then reassess as outpatient for myalgia recurrence.   Increase Eliquis to 5mg BID   Stop antiplatelets to reduce bleeding risk   Echo to complete stroke/TIA w/u, assess for cardioembolic source        History of Present Illness:  Jessica Munguia is a 79 y.o.  female with PMHx of arthritis, HLD, Afib (since 2015, on dofetilide+  eliquis), mitral regurgitation s/[ mitral valve repair in 2008 with subsequent permanent pacemaker insertion presenting for evaluation of confusion/AMS. Family went to see patient at [address] on 10/18. She was confused and AAOx1 as per family. Family additionally noted that her speech was difficult to understand on the phone. This resolved by the time EMS reached. Additionally earlier in the day, patient had trouble with right eye movements. She was then admitted for further evaluation. MRI could not be done as patient has incompatible pacemaker. On further interrogation, patient admits to difficulty opening the right eye in the afternoon yesterday and denies blurring of vision. This lasted for about an hour and then resolved by itself. As per son in law at bedside, when they called her in the evening she had abnormal speech and difficulty word finding . She sounded confused on the phone as per family. She was then brought in for further evaluation but her confusion had resolved. No fever, hemiparesis, LOC, visual symptoms, previous similar episodes. She does endorse headache that has been intermittent and present for years.  She has been compliant with her medications        Review of Systems:  Constitutional  Weight loss: no  Fever: no    Eyes  Double Vision: no  Visions loss: no    Ears, Nose, Mouth, and Throat  Difficulty swallowing: no    Cardiovascular  Chest Pain: no    Respiratory  Shortness of Breath: no    Gastrointestinal  Abdominal Pain: no    Genitourinary  Difficulty with Urination: no    Integumentary  Rash: no    Musculoskeletal  Back Pain: no    Neurological  Headaches: yes - intermittent   Weakness: no  Numbness: no  Seizures: no  Difficulty with Memory: no    Psychiatric  Anxiety: no  Depression: no  Other mental health issues: no      Medical History:   Past Medical History:   Diagnosis Date    Arthritis     osteoporosis and degenerative     Heart attack ([facility] [address] 2006    Hyperlipidemia         Surgical History:   Past Surgical History:   Procedure Laterality Date    APPENDECTOMY  2009   [address] HYSTERECTOMY  9/26/79    tv    MITRAL VALVE REPLACEMENT  2008    PACEMAKER PLACEMENT  2008    PGR with Dr Elizabeth Kingert De[employer]ers device 7/11/14    SHOULDER SURGERY      BENIGN BONE TUMOR        Family History:   Family History   Problem Relation Age of Onset    Breast Cancer Sister        -    Social History:  Social History     Tobacco Use    Smoking status: Never Smoker    Smokeless tobacco: Never Used   Substance Use Topics    Alcohol use: No    Drug use: No        Current Medications:      Current Facility-Administered Medications   Medication Dose Route Frequency Provider Last Rate Last Admin    sodium chloride flush 0.9 % injection 5-40 mL  5-40 mL IntraVENous 2 times per day April DELIA Stout CNP        sodium chloride flush 0.9 % injection 5-40 mL  5-40 mL IntraVENous PRN April DELIA Stout CNP        0.9 % sodium chloride infusion  25 mL IntraVENous PRN April DELIA Stout CNP        polyethylene glycol (GLYCOLAX) packet 17 g  17 g Oral Daily PRN April DELIA Stout CNP        acetaminophen (TYLENOL) tablet 650 mg  650 mg Oral Q6H PRN April DELIA Stout CNP        Or    acetaminophen (TYLENOL) suppository 650 mg  650 mg Rectal Q6H PRN April DELIA Stout CNP        apixaban (ELIQUIS) tablet 5 mg  5 mg Oral BID April DELIA Stout CNP        calcium carbonate tablet 600 mg  1 tablet Oral Daily April DELIA Stout CNP        dofetilide (TIKOSYN) capsule 125 mcg  125 mcg Oral BID April DELIA Stout CNP        venlafaxine (EFFEXOR XR) extended release capsule 37.5 mg  37.5 mg Oral Daily April DELIA Stout CNP        vitamin D (CHOLECALCIFEROL) tablet 1,000 Units  1,000 Units Oral Daily April DELIA Stout CNP        prochlorperazine (COMPAZINE) injection 5 mg  5 mg IntraVENous Q6H PRN April DELIA Stout CNP        [START ON 10/20/2021] aspirin chewable tablet 81 mg  81 mg Oral Daily Ervin Weber MD            Allergies: Allergies   Allergen Reactions    Latex     Lipitor [Atorvastatin]     Tricor [Fenofibrate]     Zocor [Simvastatin]         Physical Examination  Vitals   Vitals:    10/18/21 2136 10/19/21 0156 10/19/21 0617 10/19/21 0830   BP: 118/88 120/78 138/86 (!) 153/64   Pulse: 95 87 65 63   Resp: 16 16 18 18   Temp: 97.1 °F (36.2 °C) 97.4 °F (36.3 °C) 97.9 °F (36.6 °C) 96.9 °F (36.1 °C)   TempSrc: Temporal Oral Temporal Temporal   SpO2: 97% 98% 95% 95%   Weight: 160 lb (72.6 kg)      Height: 5' 5\" (1.651 m)           General: Patient appears in no acute distress  HEENT: Normocephalic, atraumatic  Chest: Clear to auscultation bilaterally  Heart: Regular rate and rhythm, no murmurs appreciated  Extremities: No edema or cyanosis noted    Neurologic Examination    Mental Status  Alert, and oriented to person, place and time with normal speech and language. No evidence of aphasia during conversation. No evidence of memory impairment. Attention and concentration appeared normal.     Cranial Nerves  II. Visual fields full to confrontation bilaterally. Fundoscopic exam: Discs sharp bilaterally  III, IV, VI: Pupils equally round and reactive to light, 3 to 2 mm bilaterally. EOMs: full, no nystagmus. V. Facial sensation intact to light touch bilaterally  VII: Facial movements symmetric and strong  VIII: Hearing intact to voice  IX,X: Palate elevates symmetrically.  No dysarthria  XI: Sternocleidomastoid and trapezius 5/5 bilaterally   XII: Tongue is midline    Motor     Right Left   Right Left   Deltoid 5 5  Hip Flexion 5 5   Biceps      5  5  Knee Extension 5 5   Triceps 5 5  Knee Flexion 5 5   Handgrip 5 5  Ankle Dorsiflexion 5 5       Ankle Plantarflexion 5 5     Tone: Normal in all four limbs    Bulk: Normal in all four limbs with no evidence of atrophy    Sensation  Light Touch: Intact distally in all four limbs  Pinprick: Intact distally in all four limbs  Vibration: Intact distally in all four limbs  Proprioception: Intact distally in all four limbs    Reflexes     Right Left   Biceps 2 2   Brachioradialis 2 2   Triceps 2 2   Patellar 2 2   Achilles 2 2   ankle clonus none none     Toes down going bilaterally. Coordination  Rapid alternating movements normal in bilateral upper extremities  Finger to nose testing normal bilaterally  Heel to shin testing normal bilaterally    Gait  Normal base, stride, and arm swing with casual gait. 2-3 steps to turn. Normal heel, toe and tandem gait. Romberg test negative      Labs  BMP , LFT ,CBC WNL    Imaging  CT HEAD AND CTA HEAD AND NECK     Impression   1. No acute intracranial hemorrhage or mass effect. 2. 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 Focal calcified atherosclerotic plaque in the left middle   cerebral artery M1 segment. Otherwise, the bilateral middle cerebral artery   branches are patent. 3. No hemodynamically significant stenosis in the bilateral cervical internal   carotid arteries per NASCET criteria. Bilateral carotid bulb atherosclerotic   plaque. 4. Patent bilateral vertebral arteries. Follow-up brain MRI and/or conventional catheter angiogram may be helpful for   further evaluation.              Other Testing Results  EKG showing atrial paced rhythm with occasional PACs        Electronically signed by: Mely Ramachandran MD, 10/19/2021 10:56 AM

## 2021-10-19 NOTE — PROGRESS NOTES
OCCUPATIONAL THERAPY INITIAL EVALUATION    [address] [address] [place]      Date:10/19/2021                                                  Patient Name: Renetta Wright  MRN: [id number]  : 1940  Room: [facility]2570-X    Evaluating OT: MALVIN Chacon, OTR/L [id number]  Referring Provider:DELIA Estrada CNP  Specific Provider Orders: OT eval and treat 10/19  Recommended Adaptive Equipment: shower chair     Diagnosis: TIA   Surgery: none   Pertinent Medical History: HLD, heart attack, pacemaker   Precautions:  Fall Risk, Kos[place] speaking    Assessment of current deficits   [x] Functional mobility  [x]ADLs  [x] Strength               [x]Cognition   [x] Functional transfers   [x] IADLs         [x] Safety Awareness   [x]Endurance   [] Fine Coordination              [x] Balance      [] Vision/perception   [x]Sensation    []Gross Motor Coordination  [] ROM  [] Delirium                   [] Motor Control     OT PLAN OF CARE   OT POC based on physician orders, patient diagnosis and results of clinical assessment    Frequency/Duration: 2-3 days/wk for 2 weeks PRN   Specific OT Treatment to include:   * Instruction/training on adapted ADL techniques and AE recommendations to increase functional independence within precautions       * Training on energy conservation strategies, correct breathing pattern and techniques to improve independence/tolerance for self-care routine  * Functional transfer/mobility training/DME recommendations for increased independence, safety, and fall prevention  * Patient/Family education to increase follow through with safety techniques and functional independence  * Recommendation of environmental modifications for increased safety with functional transfers/mobility and ADLs  * Therapeutic exercise to improve motor endurance, ROM, and functional strength for ADLs/functional transfers  * Therapeutic activities to facilitate/challenge Mobility Min A (hand held assist, to/from bathroom)  SBA   Balance Sitting: SBA  Standing: Min A     Activity Tolerance fair     Visual/  Perceptual Glasses: yes    Pt did not verbalize the correct time on clock              Hand dominance: R  UE ROM: RUE:  WFL  LUE:  WFL  Strength: RUE: grossly 4/5 LUE: grossly 4/5   Strength: B WFL  Fine Motor Coordination:  fair    Hearing: WFL  Sensation:  No c/o numbness/tingling   Tone:  WFL  Edema: none noted                            Comments:Cleared by RN to see pt. Upon arrival, patient supine in bed and agreeable to OT session. Son in law present. When son was speaking with pt, he reported that pt reported difficulty with her speech. Son in law reports pt understands most english and able to verbalize. At end of session, patient with PT. Pt would benefit from continued  OT to increase functional independence and quality of life. Treatment: Completed ADLs/functional transfers, see above for assessment. Pt required vc's and physical assist for proper technique/safety with hand placement/body mechanics/posture for bed mobility/ADLs/functional tranfers/mobility. Pt observed to hold onto furniture with mobility. Pt stood at sink ~5 mins to increase core strength/balance/activity tolerance for ease with ADLs. Pt appeared to have tolerated session well and appears cooperative/pleasant . Pt limited by balance. Pt demo'ing fair understanding of education provided. Continue to educate. Eval Complexity: Low    Rehab Potential: Good for established goals, pt. assisted in establishment of goals. LTG: maximize independence with ADLs to return to OF    Patient and family instructed on diagnosis, prognosis/goals and plan of care. Demonstrated fair understanding. [] Malnutrition indicators have been identified and nursing has been notified to ensure a dietitian consult is ordered.      Evaluation time includes thorough review of current medical information, gathering information on past medical & social history & PLOF, completion of standardized testing, informal observation of tasks, consultation with other medical professions/disciplines, assessment of data & development of POC/goals.      Time In: 1040       Time Out: 1055     Total treatment time: 0       Treatment Charges: Mins Units   OT Eval Low 97474 X    OT Eval Medium 04586     OT Eval High [id number]     OT Re-Eval [id number]     Ther Ex  79013       Manual Therapy 66332       Thera Activities 05942       ADL/Home Mgt 40646     Neuro Re-ed 32337       Group Therapy        Orthotic manage/training  45188       Non-Billable Time           Matty Angel, OTR/L [id number]

## 2021-10-19 NOTE — PROGRESS NOTES
Patient has a [employer] pacemaker that is NOT MRI compatible. Confirmed with [employer]. RN was notified and order cancelled per protocol. RN to let ordering doctor know.

## 2021-10-19 NOTE — CONSULTS
CARDIOLOGY CONSULTATION    Patient Name:  Harlow Bamberger    :  1940    Reason for Consultation:   History of atrial fibrillation/S/P mitral valve repair; S/P permanent dual-chamber pacemaker    History of Present Illness:   Harlow Bamberger presents to [address] [place] [address] following the sudden onset of expressive dysarthria yesterday afternoon. She has no previous history of similar symptoms. Her daughter spoke with me last evening and she was advised to immediately go to an emergency room for further evaluation even though the patient was hesitant to do so. She has a previous history of coronary artery disease and received a coronary artery stent in approximately  and 2 years later underwent mitral valve repair in  with subsequent insertion of a permanent pacemaker with atrial pacing .her physicalactivities are somewhat limitedactive since that time has various complaints related to shoulder and occasional fatigability. She is unaware of her cholesterol level presently but notes it to be normal.she is now admitted for further diagnostic studies and adjustment of her medical regimen. Past Medical History:   has a past medical history of severe mitral regurgitation.;  Sinus node dysfunction; hypercholesterolemia; degenerative osteoarthritis; osteoporosis. .    Surgical History:   has a past surgical history that includes Hysterectomy (79); Arm Debridement (); Mitral valve replacement (); Appendectomy (); pacemaker placement (); and shoulder surgery. Social History:   reports that she has never smoked. She has never used smokeless tobacco. She reports that she does not drink alcohol and does not use drugs. Family History:  family history includes Breast Cancer in her sister. Father  age 76 sudden death and mother  at age 68 secondary to pancreatic cancer.     Medications:  Prior to Admission medications    Medication Sig Start Date End Date Taking? Authorizing Provider   apixaban (ELIQUIS) 2.5 MG TABS tablet Take 2.5 mg by mouth 2 times daily   Yes Historical Provider, MD   venlafaxine (EFFEXOR XR) 37.5 MG extended release capsule TAKE ONE CAPSULE BY MOUTH EVERY DAY 5/16/17  Yes Historical Provider, MD   vitamin D (CHOLECALCIFEROL) 1000 UNIT TABS tablet Take 1,000 Units by mouth daily   Yes Historical Provider, MD   dofetilide (TIKOSYN) 125 MCG capsule Take 125 mcg by mouth 2 times daily   Yes Historical Provider, MD   ALPRAZolam (XANAX) 0.5 MG tablet Take 0.5 mg by mouth 2 times daily   Yes Historical Provider, MD   calcium carbonate 600 MG TABS tablet Take 1 tablet by mouth daily. Yes Historical Provider, MD       Allergies:  Latex, Lipitor [atorvastatin], Tricor [fenofibrate], and Zocor [simvastatin]     Review of Systems:   · Constitutional: there has been no unanticipated weight loss. There's been no change in energy level, sleep pattern or activity level. No fever chills or rigors. · Eyes: No visual changes or diplopia. No scleral icterus. · ENT: No Headaches, hearing loss or vertigo. No mouth sores or sore throat. No change in taste or smell. · Cardiovascular: benign chest discomfort,no dyspnea on exertion,denies palpitations, loss of consciousness or phlebitis. · Respiratory: No cough or wheezing, no sputum production. No hemoptysis, pleuritic pain. · Gastrointestinal: No abdominal pain, appetite loss, blood in stools. No change in bowel habits. No hematemesis  · Genitourinary: No dysuria, trouble voiding or hematuria. No nocturia or increased frequency. · Musculoskeletal:  No gait disturbance, weakness left shoulder bursitis. Weakness and lower extremities  · Integumentary: No rash or pruritis. · Neurological: No headache, diplopia, change in muscle strength, numbness or tingling. No change in gait, balance, coordination, mood, affect, memory, mentation, behavior. · Psychiatric: Has anxiety no obvious depression.   · Endocrine: No temperature intolerance. No excessive thirst, fluid intake, or urination. No tremor. · Hematologic/Lymphatic: No abnormal bruising or bleeding, blood clots or swollen lymph nodes. · Allergic/Immunologic: No nasal congestion or hives. Physical Examination:    Vital Signs: /63   Pulse 61   Temp 96.8 °F (36 °C) (Temporal)   Resp 18   Ht 5' 5\" (1.651 m)   Wt 160 lb (72.6 kg)   LMP 09/10/1979   SpO2 95%   BMI 26.63 kg/m²   General appearance: Well preserved, mesomorphic body habitus, alert, no distress presently. Skin: Skin color, texture, turgor normal. No rashes or lesions. No induration or tightening palpated. Head: Normocephalic. No masses, lesions, tenderness or abnormalities  Eyes: Conjunctivae/corneas clear. PERRL, EOMs intact. Sclera non icteric. Ears: External ears normal. Canals clear. TM's clear bilaterally. Hearing normal to finger rub. Nose/Sinuses: Nares normal. Septum midline. Mucosa normal. No drainage or sinus tenderness. Oropharynx: Lips, mucosa, and tongue normal. Oropharynx clear with no exudate seen. Neck: Neck supple and symmetric. No adenopathy. Thyroid symmetric, normal size, without nodules. Trachea is midline. Carotids brisk in upstroke without bruits, no abnormal JVP noted at 45°. Chest: Even excursion. Pulse generator pocket well healed without swelling or erythema. Lungs: Lungs clear to auscultation bilaterally. No retractions or use of accessory muscles. No tactile vocal fremitus. No rhonchi, crackles or rales. Heart:  S1 > S2. Regular rate and rhythm. No gallop or murmur. No rub, palpable thrill or heave noted. PMI 5th intercostal space midclavicular line. Abdomen: Abdomen soft, mildly protuberant, non-tender. BS normal. No masses, organomegaly. No hernia noted. Extremities: Extremities normal. No deformities, edema, or skin discoloration. No cyanosis or clubbing noted to the nails.  Peripheral pulses present 2+ upper extremities andpresent 2+  lower extremities. Musculoskeletal: Spine ROM normal. Muscular strength intact. Neuro: Cranial nerves intact. Motor: Strength 5/5 in all extremities. Reflexes 2+ in all extremities. No focal weakness. Sensory: grossly normal to touch. Coordination intact. Pertinent Labs:  CBC:   Recent Labs     10/18/21  2303 10/19/21  1022   WBC 6.1 6.0   HGB 14.0 13.2    144     BMP:  Recent Labs     10/18/21  2303 10/18/21  2303 10/19/21  1022     --  143   K 4.2  --  4.1     --  108*   CO2 27  --  20*   BUN 16  --  11   CREATININE 0.7  --  0.5   GLUCOSE 115*  --  176*   LABGLOM >60   < > >60    < > = values in this interval not displayed. ABGs:   No results found for: PHART  INR:   No results for input(s): INR in the last 72 hours. PRO-BNP:   No results for input(s): BNP in the last 72 hours. TSH:   Lab Results   Component Value Date    TSH 9.800 (H) 09/14/2015      Cardiac Injury Profile:   No results for input(s): CKTOTAL, CKMB, CKMBINDEX, TROPONINI in the last 72 hours. Lipid Profile:   Lab Results   Component Value Date    TRIG 131 10/19/2021      Hemoglobin A1C:   No components found for: HGBA1C   ECG:  See report    Radiology:  7/18/2014  Examination: Chest one view portable. Indications: Chest pain. Comparison: July 11, 2014. Findings: There are bibasilar infiltrates likely related to   atelectasis or pneumonia. The rest of the lung fields are clear. The cardiac silhouette is within normal limits. There are stable   cardiac pacer leads in situ. There are no pleural effusions or   pneumothorax. IMPRESSION:   1. Minimal bibasilar infiltrates likely related to atelectasis or   pneumonia.          Assessment:    Patient Active Problem List   Diagnosis    Chest discomfort    Status post mitral valve repair    History of congenital mitral regurgitation    Sinus node dysfunction (HCC)    Presence of permanent cardiac pacemaker    Osteoporosis    Osteoarthritis    Cervicalgia  Dyspnea on effort    Tachycardia    Hypothyroidism    Senile osteoporosis    TIA (transient ischemic attack)    Anxiety and depression    Atrial fibrillation (HCC)    Middle cerebral artery stenosis    Vitamin D deficiency       Plan:  Mrs. Gil Pérez presents with a rather difficult situation that we already have established history of atrial fibrillation but also bilateral middle artery stenosis. we will have pacemaker representative interrogate her pacemaker for any sudden failure of conduction etcwe will adjust all cardiac medications as indicated and maintain apixaban 5 mg twice a day in accordance with factors FDA recommendation. I have spent more than 50 minutes face to face with Terrilee Bamberger, with greater than 50% of this time instructing and counseling the patient  regarding my findings and recommendations and I have answered all questions as posed to me by Ms. Jordan Escobar her family members at her bedside. Thank you for allowing me to consult in the care of this patient. Naldo Carcamo DO DO, FACP, [facility], [facility]    NOTE:  This report was transcribed using voice recognition software. Every effort was made to ensure accuracy; however, inadvertent computerized transcription errors may be present.

## 2021-10-19 NOTE — H&P
Violet Pallas, M.D. History and Physical      CHIEF COMPLAINT: Confusion    Reason for Admission: Concern for stroke/TIA    History Obtained From: Patient/EMR    HISTORY OF PRESENT ILLNESS:      The patient is a 79 y.o. female of [place] Agust[place] MD with significant past medical history of atrial fibrillation on oral anticoagulation with Eliquis who presents with complaints of one-time confusion associated also with difficulty opening and closing right eye. Patient was also having trouble getting her words out and understanding what they were saying. Secondary to the symptoms, they brought her in for stroke work-up. Emergency department course included CT head along with CTA and CTP all of which were unremarkable. CTA neck shows high-grade stenosis of bilateral middle cerebral M1 segments. No focal deficits of upper or lower extremities.   Family is at bedside on my evaluationpatient indicates she feels about the same, no acute complaints currently        All labs personally reviewed   All imaging personally reviewed     Past Medical History:        Diagnosis Date    Arthritis     osteoporosis and degenerative     Heart attack ([address] 2006    Hyperlipidemia      Past Surgical History:        Procedure Laterality Date    APPENDECTOMY  2009   [address]  9/26/79    tv    MITRAL VALVE REPLACEMENT  2008    PACEMAKER PLACEMENT  2008    PGR with Dr Elizabeth Major [employer]ers device 7/11/14    SHOULDER SURGERY      BENIGN BONE TUMOR         Medications Prior to Admission:    Medications Prior to Admission: venlafaxine (EFFEXOR XR) 37.5 MG extended release capsule, TAKE ONE CAPSULE BY MOUTH EVERY DAY  vitamin D (CHOLECALCIFEROL) 1000 UNIT TABS tablet, Take 1,000 Units by mouth daily  dofetilide (TIKOSYN) 125 MCG capsule, Take 125 mcg by mouth 2 times daily  ALPRAZolam (XANAX) 0.5 MG tablet, Take 0.5 mg by mouth 2 times daily  apixaban (ELIQUIS) 5 MG TABS tablet, Take 1 tablet by mouth 2 times daily. calcium carbonate 600 MG TABS tablet, Take 1 tablet by mouth daily. Allergies:  Latex, Lipitor [atorvastatin], Tricor [fenofibrate], and Zocor [simvastatin]    Social History:   TOBACCO:   reports that she has never smoked. She has never used smokeless tobacco.  ETOH:   reports no history of alcohol use. MARITAL STATUS:    OCCUPATION:      Family History:       Problem Relation Age of Onset    Breast Cancer Sister        REVIEW OF SYSTEMS:    General ROS: positive for  - fatigue and malaise  Hematological and Lymphatic ROS: negative  Endocrine ROS: negative  Respiratory ROS: no cough, shortness of breath, or wheezing  Cardiovascular ROS: no chest pain or dyspnea on exertion  Gastrointestinal ROS: no abdominal pain, change in bowel habits, or black or bloody stools  Genito-Urinary ROS: no dysuria, trouble voiding, or hematuria  Neurological ROS: no TIA or stroke symptoms  negative    Vitals:  BP (!) 153/64   Pulse 63   Temp 96.9 °F (36.1 °C) (Temporal)   Resp 18   Ht 5' 5\" (1.651 m)   Wt 160 lb (72.6 kg)   LMP 09/10/1979   SpO2 95%   BMI 26.63 kg/m²     PHYSICAL EXAM:  General:  Awake, alert, oriented X 3. Well developed, well nourished, well groomed. No apparent distress. HEENT:  Normocephalic, atraumatic. Pupils equal, round, reactive to light. No scleral icterus. No conjunctival injection. Normal lips, teeth, and gums. No nasal discharge. Neck:  Supple, FROM  Heart:  RRR, no murmurs, gallops, rubs, carotid upstroke normal, no carotid bruits  Lungs:  CTA bilaterally, bilat symmetrical expansion, no wheeze, rales, or rhonchi  Abdomen:   Bowel sounds present, soft, nontender, no masses, no organomegaly, no peritoneal signs  Extremities:  No clubbing, cyanosis, or edema  Skin:  Warm and dry, no open lesions or rash  Neuro:  Cranial nerves 2-12 intact, no focal deficits  Vascular: Radial and pedal Pulses 2+  Breast: deferred  Rectal: deferred  Genitalia: deferred      DATA:     Recent Labs     10/18/21  2303   WBC 6.1   HGB 14.0        Recent Labs     10/18/21  2303      K 4.2   BUN 16   CREATININE 0.7     Recent Labs     10/18/21  2303   PROT 7.4     Recent Labs     10/18/21  2303   AST 20   ALT 8   ALKPHOS 66   BILITOT 0.3     No results for input(s): BNP in the last 72 hours. No results for input(s): CKTOTAL, CKMB, CKMBINDEX, TROPONINI in the last 72 hours. ASSESSMENT:      Principal Problem:    TIA (transient ischemic attack)  Active Problems:    Presence of permanent cardiac pacemaker    Hypothyroidism    Anxiety and depression    Atrial fibrillation (HCC)    Middle cerebral artery stenosis    Vitamin D deficiency  Resolved Problems:    * No resolved hospital problems.  *        PLAN:    Admit to telemetry for concern of TIA symptoms  No radiological data to confirm stroke  Unable to obtain MRI secondary to incompatible pacemaker in place  Continue Eliquis for A. fib stroke preventionwould consider increasing her Eliquis to 5 p.o. twice dailyunclear why she is on 2.5 twice daily as her renal function is normal  We will check with Dr. Dulce Paulson office in regards to this  start aspirin 81 daily  High intensity statin  Check lipid panel 228 total cholesterol in 148 LDL and hemoglobin A1c  Blood pressure control  Neurology consultedawait input    DVT prophylaxis  PT OT  Discharge plan    Benjamin Johnston MD  10/19/2021  11:07 AM

## 2021-10-20 PROCEDURE — 6370000000 HC RX 637 (ALT 250 FOR IP): Performed by: INTERNAL MEDICINE

## 2021-10-20 PROCEDURE — 2580000003 HC RX 258: Performed by: NURSE PRACTITIONER

## 2021-10-20 PROCEDURE — 99232 SBSQ HOSP IP/OBS MODERATE 35: CPT | Performed by: PHYSICIAN ASSISTANT

## 2021-10-20 PROCEDURE — G0378 HOSPITAL OBSERVATION PER HR: HCPCS

## 2021-10-20 PROCEDURE — 1200000000 HC SEMI PRIVATE

## 2021-10-20 PROCEDURE — 6370000000 HC RX 637 (ALT 250 FOR IP): Performed by: NURSE PRACTITIONER

## 2021-10-20 RX ADMIN — ROSUVASTATIN CALCIUM 5 MG: 5 TABLET, FILM COATED ORAL at 21:37

## 2021-10-20 RX ADMIN — APIXABAN 5 MG: 5 TABLET, FILM COATED ORAL at 09:26

## 2021-10-20 RX ADMIN — DOFETILIDE 125 MCG: 0.12 CAPSULE ORAL at 09:26

## 2021-10-20 RX ADMIN — APIXABAN 5 MG: 5 TABLET, FILM COATED ORAL at 21:36

## 2021-10-20 RX ADMIN — Medication 10 ML: at 09:29

## 2021-10-20 RX ADMIN — VENLAFAXINE HYDROCHLORIDE 37.5 MG: 37.5 CAPSULE, EXTENDED RELEASE ORAL at 18:51

## 2021-10-20 RX ADMIN — Medication 1000 UNITS: at 09:28

## 2021-10-20 RX ADMIN — CALCIUM CARBONATE 500 MG: 500 TABLET, CHEWABLE ORAL at 09:26

## 2021-10-20 RX ADMIN — DOFETILIDE 125 MCG: 0.12 CAPSULE ORAL at 21:36

## 2021-10-20 RX ADMIN — Medication 5 ML: at 21:37

## 2021-10-20 RX ADMIN — EZETIMIBE 10 MG: 10 TABLET ORAL at 21:36

## 2021-10-20 NOTE — PROGRESS NOTES
SPEECH/LANGUAGE PATHOLOGY  CLINICAL ASSESSMENT OF SWALLOWING FUNCTION   and PLAN OF CARE  PATIENT NAME:  Jo Ann Duncan  (female)     MRN:  [id number]    :  1940  (79 y.o.)  STATUS:  Inpatient: Room 8518/8518-A    TODAY'S DATE:  10/20/2021  REFERRING PROVIDER:   DELIA Estrada CNP  SPECIFIC PROVIDER ORDER: SLP swallowing-dysphagia evaluation and treatment Date of order:  10-19-21  REASON FOR REFERRAL: dysphagia   EVALUATING THERAPIST: LEVI Magdaleno                 ASSESSMENT:    DYSPHAGIA DIAGNOSIS:   Clinical indicators of normal swallow function      DIET RECOMMENDATIONS:  Regular consistency solids (IDDSI level 7) with  thin liquids (IDDSI level 0)     FEEDING RECOMMENDATIONS:     Assistance level:  No assistance needed      Compensatory strategies recommended: No strategies are recommended at this time      Discussed recommendations with nursing?: No secondary to no diet/liquid change recommended     SPEECH THERAPY  PLAN OF CARE   The dysphagia POC is established based on physician order, dysphagia diagnosis and results of clinical assessment     Dysphagia therapy is not recommended     Conditions Requiring Skilled Therapeutic Intervention for dysphagia:    not applicable    Specific dysphagia interventions to include:     Not applicable    Specific instructions for next treatment:  not applicable   Patient Treatment Goals:    Short Term Goals:  Not applicable no therapy warranted     Long Term Goals:   Not applicable no therapy warranted      Patient/family Goal:    not applicable                    ADMITTING DIAGNOSIS: TIA (transient ischemic attack) [G45.9]  Confusion [R41.0]    VISIT DIAGNOSIS:   Visit Diagnoses       Codes    Confusion    -  Primary R41.0           PATIENT REPORT/COMPLAINT: none stated    nursing not available at time of evaluation chart reviewed and patient is not NPO for any procedures per current documentation.      PRIOR LEVEL OF SWALLOW FUNCTION:    PAST HISTORY OF DYSPHAGIA?: none reported    Home diet: Regular consistency solids (IDDSI level 7) with  thin liquids (IDDSI level 0)  PROCEDURE:  Consistencies Administered During the Evaluation   Liquids: thin liquid   Solids:  pureed foods and soft solid foods      Method of Intake:   cup, straw, spoon  Self fed      Position:   Seated, upright    CLINICAL ASSESSMENT  Oral Stage: The oral stage of swallowing was within functional limits      Pharyngeal Stage:    No signs of aspiration were noted during this evaluation however, silent aspiration cannot be ruled out at bedside. If silent aspiration is suspected, a Videofluoroscopic Study of Swallowing (MBS) is recommended and requires a physician order. Cognition:   Language impaired, also esl    Oral Peripheral Examination   Adequate lingual/labial strength     Current Respiratory Status    room air     Parameters of Speech Production  Respiration:  Adequate for speech production  Quality:   Within functional limits  Intensity: Within functional limits    Volitional Swallow: Present    Volitional Cough:    Present    Pain: No pain reported. EDUCATION:   The Speech Language Pathologist (SLP) completed education regarding results of evaluation and that intervention is not warranted at this time. Learner: Patient  Education:  Reviewed results and recommendations of this evaluation  Evaluation of Education: Needs further instruction    This plan will be re-evaluated and revised in 1 week  if warranted. CPT code:  15752  bedside swallow eval      [x]The admitting diagnosis and active problem list, have been reviewed prior to initiation of this evaluation.         ACTIVE PROBLEM LIST:   Patient Active Problem List   Diagnosis    Chest discomfort    Status post mitral valve repair    History of mitral valve prolapse in adulthood    Sinus node dysfunction (HCC)    Presence of permanent cardiac pacemaker    Osteoporosis    Osteoarthritis    Cervicalgia    Dyspnea on effort    Tachycardia    Hypothyroidism    Senile osteoporosis    TIA (transient ischemic attack)    Anxiety and depression    Atrial fibrillation (HCC)    Middle cerebral artery stenosis    Vitamin D deficiency    History of atrial fibrillation    History of coronary artery stent placement

## 2021-10-20 NOTE — PROGRESS NOTES
Subjective: The patient is awake and alert. She does not answer too many questions for me  There is a language barrier  Objective:    /80   Pulse 62   Temp 98 °F (36.7 °C)   Resp 16   Ht 5' 5\" (1.651 m)   Wt 158 lb (71.7 kg)   LMP 09/10/1979   SpO2 93%   BMI 26.29 kg/m²     No intake/output data recorded. No intake/output data recorded. General appearance: NAD, conversant  HEENT: AT/NC, MMM  Neck: FROM, supple  Lungs: Clear to auscultation  CV: RRR, no MRGs  Vasc: Radial pulses 2+  Abdomen: Soft, non-tender; no masses or HSM  Extremities: No peripheral edema or digital cyanosis  Skin: no rash, lesions or ulcers  Psych: Alert and oriented to person, place and time  Neuro: Alert and interactive     Recent Labs     10/18/21  2303 10/19/21  1022   WBC 6.1 6.0   HGB 14.0 13.2   HCT 43.8 41.3    144       Recent Labs     10/18/21  2303 10/19/21  1022    143   K 4.2 4.1    108*   CO2 27 20*   BUN 16 11   CREATININE 0.7 0.5   CALCIUM 9.7 8.8       Assessment:    Principal Problem:    TIA (transient ischemic attack)  Active Problems:    Status post mitral valve repair    History of mitral valve prolapse in adulthood    Presence of permanent cardiac pacemaker    Osteoporosis    Hypothyroidism    Anxiety and depression    Middle cerebral artery stenosis    Vitamin D deficiency    History of atrial fibrillation    History of coronary artery stent placement  Resolved Problems:    * No resolved hospital problems.  *      Plan:    Admit to telemetry for concern of TIA symptoms  No radiological data to confirm stroke  Unable to obtain MRI secondary to incompatible pacemaker in place  Continue Eliquis for A. fib stroke preventionwould consider increasing her Eliquis to 5 p.o. twice dailyunclear why she is on 2.5 twice daily as her renal function is normal  Discussed with Dr. Ranjit Chong consultation and input  Patient did have a flutter October 10 on pacer interrogation and now currently with SVTmanagement per cardiologydiscussed with Dr. Etta Baumgarten  start aspirin 81 daily, in addition to full anticoagulation due to microvascular disease at advanced age  High intensity statin  Check lipid panel 228 total cholesterol in 148 LDL and hemoglobin A1c  Blood pressure control  Neurology consultedawait input       DVT Prophylaxis   PT/OT  Discharge Leann Ortiz MD  9:57 AM  10/20/2021

## 2021-10-20 NOTE — PROGRESS NOTES
Darwin Sultana is a 79 y.o.  female     Neurology is following for stroke. Past medical history significant for arthritis, hyperlipidemia, A. Fib (was on Eliquis 2.5 mg twice daily PTA), mitral regurgitation status post mitral valve repair 2008 with subsequent permanent pacemaker insertion. She presented on 10/18 after family found the patient to be confused and having speech difficulties. CT of the head showed no acute findings. CTA of the head and neck revealed high-grade stenosis of the bilateral MCA M1 segments which may be chronic. There is also focal calcified atherosclerotic plaque in the left MCA M1 segment. There is no significant stenosis in the cervical ICAs, but bilateral carotid bulb atherosclerotic plaque was noted. Posterior circulation was patent. Unfortunately, MRI cannot be completed due to pacemaker. HG A1c 5.7 . She previously had intolerances to statins due to myalgias she has been started on low-dose Crestor and zetia this admission which she is agreeable to trying. She will follow-up outpatient for monitoring of side effects. Eliquis was also increased to 5 mg twice daily. Patient's family members at bedside and helps provide translation. The patient language is her first language, but is able to speak [place]. Currently she appears only speaking in her native language. Appears grossly symmetrical, but she does appear to be aphasic both mixed expressive and receptive components.     Review of systems unable secondary to aphasia    Allergies   Allergen Reactions    Latex     Lipitor [Atorvastatin]     Tricor [Fenofibrate]     Zocor [Simvastatin]        Objective:       /80   Pulse 62   Temp 98 °F (36.7 °C)   Resp 16   Ht 5' 5\" (1.651 m)   Wt 158 lb (71.7 kg)   LMP 09/10/1979   SpO2 93%   BMI 26.29 kg/m²        General appearance: alert, appears stated age and cooperative  Head: Normocephalic, without obvious abnormality, atraumatic  Eyes: conjunctivae/corneas clear. Neck: no adenopathy, no carotid bruit, no JVD, supple, symmetrical, trachea midline and thyroid not enlarged, symmetric, no tenderness/mass/nodules  Lungs: clear to auscultation bilaterally  Extremities: extremities normal, atraumatic, no cyanosis or edema  Skin: Skin color, texture, turgor normal. No rashes or lesions     Mental Status: Alert. Mixed aphasia- receptive and expressive. Speaking only in her native language, though fragmented and still unable to answer questions appropriately. Mainly nodding her head \"yes\" and saying \"I know\". Difficulty following even simple commands.      Cranial Nerves:  I: smell    II: visual acuity     II: visual fields Full to confrontation   II: pupils HEIDI   III,VII: ptosis None   III,IV,VI: extraocular muscles  Full ROM   V: mastication Normal   V: facial light touch sensation  Normal   V,VII: corneal reflex     VII: facial muscle function - upper  Normal   VII: facial muscle function - lower Normal   VIII: hearing Normal   IX: soft palate elevation  Normal   IX,X: gag reflex    XI: trapezius strength  5/5   XI: sternocleidomastoid strength 5/5   XI: neck extension strength  5/5   XII: tongue strength  Normal     Motor:  Grossly 4/5 throughout, does not appear to have any focal weakness  Normal tone and bulk   No abnormal movements     Sensory:  LT appreciated in all extremities     Coordination:   Trouble understanding commands for strength testing     DTR:     No Babinskis    No pathological reflexes    Laboratory/Radiology:     CBC with Differential:    Lab Results   Component Value Date    WBC 6.0 10/19/2021    RBC 4.40 10/19/2021    HGB 13.2 10/19/2021    HCT 41.3 10/19/2021     10/19/2021    MCV 93.9 10/19/2021    MCH 30.0 10/19/2021    MCHC 32.0 10/19/2021    RDW 14.6 10/19/2021    LYMPHOPCT 23.6 10/19/2021    MONOPCT 6.0 10/19/2021    BASOPCT 0.7 10/19/2021    MONOSABS 0.36 10/19/2021    LYMPHSABS 1.41 10/19/2021    EOSABS 0.10 modification     PT/OT/ Speech     Stroke education     DVT prophylaxis    Please notify neurology if echo is abnormal, call with any questions or concerns     Will need stroke clinic follow up     Discussed with family members at bedside, all questions answered       Dominic Hills PA-C  10:00 AM  10/20/2021

## 2021-10-20 NOTE — PROGRESS NOTES
PROGRESS NOTE       PATIENT PROBLEM LIST:  Principal Problem:    TIA (transient ischemic attack)  Active Problems:    Status post mitral valve repair    History of mitral valve prolapse in adulthood    Presence of permanent cardiac pacemaker    Osteoporosis    Hypothyroidism    Anxiety and depression    Middle cerebral artery stenosis    Vitamin D deficiency    History of atrial fibrillation    History of coronary artery stent placement  Resolved Problems:    * No resolved hospital problems. *      SUBJECTIVE:  Jennifer Villa appears somewhat confused to time place and person although she notes that she recognizes me.her speech is somewhat difficult to understand    REVIEW OF SYSTEMS:  General ROS: negative for - fatigue, malaise,  weight gain or weight loss  Psychological ROS: positive for - anxiety , depression  Ophthalmic ROS: negative for - decreased vision or visual distortion. ENT ROS: negative  Allergy and Immunology ROS: negative  Hematological and Lymphatic ROS: negative  Endocrine: no heat or cold intolerance and no polyphagia, polydipsia, or polyuria  Respiratory ROS: negative for - hemoptysis, pleuritic pain and shortness of breath  Cardiovascular ROS: positive for - irregular heartbeat. Gastrointestinal ROS: no abdominal pain, change in bowel habits, or black or bloody stools  Genito-Urinary ROS: no nocturia, dysuria, trouble voiding, frequency or hematuria  Musculoskeletal ROS: negative for- myalgias, arthralgias, or claudication  Neurological ROS: no TIA or stroke symptoms otherwise no significant change in symptoms or problems since yesterday as documented in previous progress notes.     SCHEDULED MEDICATIONS:   sodium chloride flush  5-40 mL IntraVENous 2 times per day    calcium carbonate  500 mg Oral Daily    dofetilide  125 mcg Oral BID    venlafaxine  37.5 mg Oral Daily    Vitamin D  1,000 Units Oral Daily    apixaban  5 mg Oral BID    ezetimibe  10 mg Oral Nightly    rosuvastatin 5 mg Oral Nightly       VITAL SIGNS:                                                                                                                          /66   Pulse 63   Temp 98.2 °F (36.8 °C) (Temporal)   Resp 16   Ht 5' 5\" (1.651 m)   Wt 158 lb (71.7 kg)   LMP 09/10/1979   SpO2 93%   BMI 26.29 kg/m²   Patient Vitals for the past 96 hrs (Last 3 readings):   Weight   10/20/21 0449 158 lb (71.7 kg)   10/18/21 2136 160 lb (72.6 kg)     OBJECTIVE:    HEENT: PERRL, EOM  Intact; sclera non-icteric, conjunctiva pink. Carotids are brisk in upstroke with normal contour. No carotid bruits. Normal jugular venous pulsation at 45°. No palpable cervical nor supraclavicular nodes. Thyroid not palpable. Trachea midline. Chest: Even excursion  Lungs: CTA B, no expiratory wheezes or rhonchi, no decreased tactile fremitus without inspiratory rales. Heart: Regular  rhythm; S1 > S2, no gallop grade 2/6 systolic murmur left mid lower sternal border. No clicks, rub, palpable thrills   or heaves. PMI nondisplaced, 5th intercostal space MCL. Abdomen: Soft, nontender, nondistended,  moderately protuberant, no masses or organomegaly. Bowel sounds active. Extremities: Without clubbing, cyanosis or edema. Pulses present 3+ upper extermities bilaterally; present 1+ DP and present 1+ PT bilaterally. Data:   Scheduled Meds: Reviewed  Continuous Infusions:    sodium chloride         Intake/Output Summary (Last 24 hours) at 10/20/2021 1334  Last data filed at 10/20/2021 0800  Gross per 24 hour   Intake 180 ml   Output    Net 180 ml     CBC:   Recent Labs     10/18/21  2303 10/19/21  1022   WBC 6.1 6.0   HGB 14.0 13.2   HCT 43.8 41.3    144     BMP:  Recent Labs     10/18/21  2303 10/18/21  2303 10/19/21  1022     --  143   K 4.2  --  4.1     --  108*   CO2 27  --  20*   BUN 16  --  11   CREATININE 0.7  --  0.5   LABGLOM >60   < > >60    < > = values in this interval not displayed.      ABGs: No results found for: PH, PO2, PCO2  INR: No results for input(s): INR in the last 72 hours. PRO-BNP:   Lab Results   Component Value Date    PROBNP 345 09/14/2015    PROBNP 327 07/18/2014      TSH:   Lab Results   Component Value Date    TSH 9.800 (H) 09/14/2015      Cardiac Injury Profile:   Lab Results   Component Value Date    CKTOTAL 86 09/14/2015    TROPHS 8 10/19/2021      Lipid Profile:   Lab Results   Component Value Date    TRIG 131 10/19/2021    HDL 54 10/19/2021    LDLCALC 148 10/19/2021    CHOL 228 10/19/2021      Hemoglobin A1C: No components found for: HGBA1C      RAD:   CTA NECK W CONTRAST    Result Date: 10/19/2021  EXAMINATION: CTA OF THE NECK; CTA OF THE HEAD WITH CONTRAST 10/18/2021 11:43 pm: TECHNIQUE: CTA of the neck was performed with the administration of intravenous contrast. Multiplanar reformatted images are provided for review. MIP images are provided for review. Stenosis of the internal carotid arteries measured using NASCET criteria. Dose modulation, iterative reconstruction, and/or weight based adjustment of the mA/kV was utilized to reduce the radiation dose to as low as reasonably achievable.; CTA of the head/brain was performed with the administration of intravenous contrast. Multiplanar reformatted images are provided for review. MIP images are provided for review. Dose modulation, iterative reconstruction, and/or weight based adjustment of the mA/kV was utilized to reduce the radiation dose to as low as reasonably achievable. Noncontrast CT of the head with reconstructed 2-D images are also provided for review. COMPARISON: None. HISTORY: ORDERING SYSTEM PROVIDED HISTORY: brief change in mental status 230pm TECHNOLOGIST PROVIDED HISTORY: Reason for exam:->brief change in mental status 230pm Has a \"code stroke\" or \"stroke alert\" been called? ->No Decision Support Exception - unselect if not a suspected or confirmed emergency medical condition->Emergency Medical Condition (MA) FINDINGS: CT HEAD: BRAIN/VENTRICLES:  No acute intracranial hemorrhage or extraaxial fluid collection. Grey-white differentiation is maintained. No evidence of mass, mass effect or midline shift. No evidence of hydrocephalus. Symmetric generalized cerebral and cerebellar volume loss. Atherosclerosis. ORBITS: The visualized portion of the orbits demonstrate no acute abnormality. SINUSES:  The visualized paranasal sinuses and mastoid air cells demonstrate no acute abnormality. SOFT TISSUES/SKULL: No acute abnormality of the visualized skull or soft tissues. CTA NECK: AORTIC ARCH/ARCH VESSELS: No dissection or arterial injury. Atherosclerosis in the visualized thoracic aorta. Atherosclerosis in the right brachiocephalic and bilateral subclavian arteries without hemodynamically significant stenosis. CAROTID ARTERIES: No dissection, arterial injury, or hemodynamically significant stenosis by NASCET criteria. Atherosclerosis in the bilateral common carotid arteries and carotid bulbs. VERTEBRAL ARTERIES: No dissection, arterial injury, or significant stenosis. SOFT TISSUES: The lung apices are clear. No cervical or superior mediastinal lymphadenopathy. The larynx and pharynx are unremarkable. No acute abnormality of the salivary and thyroid glands. Coronary artery calcifications. BONES: No acute osseous abnormality. Partially visualized median sternotomy. CTA HEAD: ANTERIOR CIRCULATION: Atherosclerosis in the bilateral intracranial internal carotid arteries without hemodynamically significant stenosis. High-grade stenosis of the bilateral middle cerebral artery M1 segments with a focal calcified atherosclerotic plaque in the left M1 segment. The bilateral middle cerebral artery branches are patent. The proximal bilateral anterior cerebral arteries are patent without focal high-grade stenosis or occlusion.  No anterior circulation aneurysm POSTERIOR CIRCULATION: No significant stenosis of the vertebral, basilar, or posterior cerebral arteries. No aneurysm. OTHER: No dural venous sinus thrombosis on this non-dedicated study. 1. No acute intracranial hemorrhage or mass effect. 2. High-grade stenosis of the bilateral middle cerebral artery M1 segments may be chronic. Focal calcified atherosclerotic plaque in the left middle cerebral artery M1 segment. Otherwise, the bilateral middle cerebral artery branches are patent. 3. No hemodynamically significant stenosis in the bilateral cervical internal carotid arteries per NASCET criteria. Bilateral carotid bulb atherosclerotic plaque. 4. Patent bilateral vertebral arteries. Follow-up brain MRI and/or conventional catheter angiogram may be helpful for further evaluation. CTA HEAD W CONTRAST    Result Date: 10/19/2021  EXAMINATION: CTA OF THE NECK; CTA OF THE HEAD WITH CONTRAST 10/18/2021 11:43 pm: TECHNIQUE: CTA of the neck was performed with the administration of intravenous contrast. Multiplanar reformatted images are provided for review. MIP images are provided for review. Stenosis of the internal carotid arteries measured using NASCET criteria. Dose modulation, iterative reconstruction, and/or weight based adjustment of the mA/kV was utilized to reduce the radiation dose to as low as reasonably achievable.; CTA of the head/brain was performed with the administration of intravenous contrast. Multiplanar reformatted images are provided for review. MIP images are provided for review. Dose modulation, iterative reconstruction, and/or weight based adjustment of the mA/kV was utilized to reduce the radiation dose to as low as reasonably achievable. Noncontrast CT of the head with reconstructed 2-D images are also provided for review. COMPARISON: None.  HISTORY: ORDERING SYSTEM PROVIDED HISTORY: brief change in mental status 230pm TECHNOLOGIST PROVIDED HISTORY: Reason for exam:->brief change in mental status 230pm Has a \"code stroke\" or \"stroke alert\" been called? ->No Decision Support Exception - unselect if not a suspected or confirmed emergency medical condition->Emergency Medical Condition (MA) FINDINGS: CT HEAD: BRAIN/VENTRICLES:  No acute intracranial hemorrhage or extraaxial fluid collection. Grey-white differentiation is maintained. No evidence of mass, mass effect or midline shift. No evidence of hydrocephalus. Symmetric generalized cerebral and cerebellar volume loss. Atherosclerosis. ORBITS: The visualized portion of the orbits demonstrate no acute abnormality. SINUSES:  The visualized paranasal sinuses and mastoid air cells demonstrate no acute abnormality. SOFT TISSUES/SKULL: No acute abnormality of the visualized skull or soft tissues. CTA NECK: AORTIC ARCH/ARCH VESSELS: No dissection or arterial injury. Atherosclerosis in the visualized thoracic aorta. Atherosclerosis in the right brachiocephalic and bilateral subclavian arteries without hemodynamically significant stenosis. CAROTID ARTERIES: No dissection, arterial injury, or hemodynamically significant stenosis by NASCET criteria. Atherosclerosis in the bilateral common carotid arteries and carotid bulbs. VERTEBRAL ARTERIES: No dissection, arterial injury, or significant stenosis. SOFT TISSUES: The lung apices are clear. No cervical or superior mediastinal lymphadenopathy. The larynx and pharynx are unremarkable. No acute abnormality of the salivary and thyroid glands. Coronary artery calcifications. BONES: No acute osseous abnormality. Partially visualized median sternotomy. CTA HEAD: ANTERIOR CIRCULATION: Atherosclerosis in the bilateral intracranial internal carotid arteries without hemodynamically significant stenosis. High-grade stenosis of the bilateral middle cerebral artery M1 segments with a focal calcified atherosclerotic plaque in the left M1 segment. The bilateral middle cerebral artery branches are patent.   The proximal bilateral anterior cerebral arteries are patent without focal high-grade stenosis or occlusion. No anterior circulation aneurysm POSTERIOR CIRCULATION: No significant stenosis of the vertebral, basilar, or posterior cerebral arteries. No aneurysm. OTHER: No dural venous sinus thrombosis on this non-dedicated study. 1. No acute intracranial hemorrhage or mass effect. 2. High-grade stenosis of the bilateral middle cerebral artery M1 segments may be chronic. Focal calcified atherosclerotic plaque in the left middle cerebral artery M1 segment. Otherwise, the bilateral middle cerebral artery branches are patent. 3. No hemodynamically significant stenosis in the bilateral cervical internal carotid arteries per NASCET criteria. Bilateral carotid bulb atherosclerotic plaque. 4. Patent bilateral vertebral arteries. Follow-up brain MRI and/or conventional catheter angiogram may be helpful for further evaluation. EKG: See Report  Echo: See Report      IMPRESSIONS:  Principal Problem:    TIA (transient ischemic attack)  Active Problems:    Status post mitral valve repair    History of mitral valve prolapse in adulthood    Presence of permanent cardiac pacemaker    Osteoporosis    Hypothyroidism    Anxiety and depression    Middle cerebral artery stenosis    Vitamin D deficiency    History of atrial fibrillation    History of coronary artery stent placement  Resolved Problems:    * No resolved hospital problems. *      RECOMMENDATIONS:  Maintain anticoagulation with apixaban 5 mg twice a day as well as dofetilide to reduce exposure to cardiac arrhythmia which has been documented upon interrogation of her pulse generator following admission. Speech therapy.     I have spent more than 25 minutes face to face with Terrilee Bamberger and reviewing notes and laboratory data, with greater than 50% of this time instructing and counseling the patient with family members present face to face regarding my findings and recommendations and I have answered all questions as posed to me by Ms. Lalit Lee  and her family members present. Margy Soulier, DO FACP,FACC,FSCAI      NOTE:  This report was transcribed using voice recognition software.   Every effort was made to ensure accuracy; however, inadvertent computerized transcription errors may be present

## 2021-10-21 VITALS
SYSTOLIC BLOOD PRESSURE: 138 MMHG | RESPIRATION RATE: 18 BRPM | HEART RATE: 68 BPM | TEMPERATURE: 97.6 F | HEIGHT: 65 IN | BODY MASS INDEX: 26.33 KG/M2 | OXYGEN SATURATION: 93 % | WEIGHT: 158 LBS | DIASTOLIC BLOOD PRESSURE: 64 MMHG

## 2021-10-21 PROBLEM — I63.9 ACUTE ISCHEMIC STROKE (HCC): Status: ACTIVE | Noted: 2021-10-21

## 2021-10-21 LAB
LV EF: 60 %
LVEF MODALITY: NORMAL

## 2021-10-21 PROCEDURE — G0378 HOSPITAL OBSERVATION PER HR: HCPCS

## 2021-10-21 PROCEDURE — 2580000003 HC RX 258: Performed by: NURSE PRACTITIONER

## 2021-10-21 PROCEDURE — 93306 TTE W/DOPPLER COMPLETE: CPT

## 2021-10-21 PROCEDURE — 6370000000 HC RX 637 (ALT 250 FOR IP): Performed by: INTERNAL MEDICINE

## 2021-10-21 PROCEDURE — 6370000000 HC RX 637 (ALT 250 FOR IP): Performed by: NURSE PRACTITIONER

## 2021-10-21 RX ORDER — ROSUVASTATIN CALCIUM 5 MG/1
5 TABLET, COATED ORAL NIGHTLY
Qty: 30 TABLET | Refills: 3 | Status: SHIPPED | OUTPATIENT
Start: 2021-10-21

## 2021-10-21 RX ORDER — EZETIMIBE 10 MG/1
10 TABLET ORAL NIGHTLY
Qty: 30 TABLET | Refills: 3 | Status: SHIPPED | OUTPATIENT
Start: 2021-10-21

## 2021-10-21 RX ADMIN — Medication 1000 UNITS: at 09:46

## 2021-10-21 RX ADMIN — Medication 10 ML: at 09:46

## 2021-10-21 RX ADMIN — APIXABAN 5 MG: 5 TABLET, FILM COATED ORAL at 09:46

## 2021-10-21 RX ADMIN — DOFETILIDE 125 MCG: 0.12 CAPSULE ORAL at 09:46

## 2021-10-21 RX ADMIN — CALCIUM CARBONATE 500 MG: 500 TABLET, CHEWABLE ORAL at 09:46

## 2021-10-21 NOTE — PROGRESS NOTES
Subjective: The patient is awake   She does not answer too many questions for me  There is a language barrier      Objective:    /63   Pulse 60   Temp 97.6 °F (36.4 °C) (Temporal)   Resp 20   Ht 5' 5\" (1.651 m)   Wt 158 lb (71.7 kg)   LMP 09/10/1979   SpO2 96%   BMI 26.29 kg/m²     In: 480 [P.O.:480]  Out: -   In: 480   Out: -     General appearance: NAD,   HEENT: AT/NC, MMM  Neck: FROM, supple  Lungs: Clear to auscultation  CV: RRR, no MRGs  Vasc: Radial pulses 2+  Abdomen: Soft, non-tender; no masses or HSM  Extremities: No peripheral edema or digital cyanosis  Skin: no rash, lesions or ulcers  Psych: Alert and oriented to person, place and time  Neuro: Alert and interactive     Recent Labs     10/18/21  2303 10/19/21  1022   WBC 6.1 6.0   HGB 14.0 13.2   HCT 43.8 41.3    144       Recent Labs     10/18/21  2303 10/19/21  1022    143   K 4.2 4.1    108*   CO2 27 20*   BUN 16 11   CREATININE 0.7 0.5   CALCIUM 9.7 8.8       Assessment:    Principal Problem:    TIA (transient ischemic attack)  Active Problems:    Status post mitral valve repair    History of mitral valve prolapse in adulthood    Presence of permanent cardiac pacemaker    Osteoporosis    Hypothyroidism    Anxiety and depression    Middle cerebral artery stenosis    Vitamin D deficiency    History of atrial fibrillation    History of coronary artery stent placement    Acute ischemic stroke ([facility] [address]  Resolved Problems:    * No resolved hospital problems.  *      Plan:    Admit to telemetry for concern of TIA symptoms  No radiological data to confirm stroke  Unable to obtain MRI secondary to incompatible pacemaker in place  Continue Eliquis for A. fib stroke preventionwould consider increasing her Eliquis to 5 p.o. twice dailyunclear why she is on 2.5 twice daily as her renal function is normal  Discussed with Dr. Yovany Richard consultation and input  Patient did have a flutter October 10 on pacer interrogation and now currently with SVTmanagement per cardiologydiscussed with Dr. Yvan Sandoval  start aspirin 81 daily, in addition to full anticoagulation due to microvascular disease at advanced age  High intensity statin  Check lipid panel 228 total cholesterol in 148 LDL and hemoglobin A1cZetia added  Blood pressure control  Neurology consultedinput appreciated    DVT Prophylaxis   PT/OT  Discharge planning- Once echocardiogram completed she can be discharged          Suly Kent MD  11:38 AM  10/21/2021

## 2021-10-21 NOTE — PLAN OF CARE
Problem:  Activity:  Goal: Ability to tolerate increased activity will improve  Description: Ability to tolerate increased activity will improve  Outcome: Completed  Goal: Expression of feelings of increased energy will increase  Description: Expression of feelings of increased energy will increase  Outcome: Completed     Problem: Cardiac:  Goal: Ability to maintain an adequate cardiac output will improve  Description: Ability to maintain an adequate cardiac output will improve  Outcome: Completed  Goal: Complications related to the disease process, condition or treatment will be avoided or minimized  Description: Complications related to the disease process, condition or treatment will be avoided or minimized  Outcome: Completed     Problem: Coping:  Goal: Level of anxiety will decrease  Description: Level of anxiety will decrease  Outcome: Completed  Goal: General experience of comfort will improve  Description: General experience of comfort will improve  Outcome: Completed     Problem: Health Behavior:  Goal: Ability to manage health-related needs will improve  Description: Ability to manage health-related needs will improve  Outcome: Completed     Problem: Safety:  Goal: Ability to remain free from injury will improve  Description: Ability to remain free from injury will improve  Outcome: Completed  Goal: Will show no signs and symptoms of excessive bleeding  Description: Will show no signs and symptoms of excessive bleeding  Outcome: Completed     Problem: Musculor/Skeletal Functional Status  Goal: Highest potential functional level  Outcome: Completed  Goal: Absence of falls  Outcome: Completed     Problem: Falls - Risk of:  Goal: Will remain free from falls  Description: Will remain free from falls  Outcome: Completed  Goal: Absence of physical injury  Description: Absence of physical injury  Outcome: Completed

## 2021-10-21 NOTE — PROGRESS NOTES
Called daughter, Kassi Villafuerte about meds to beds co-pay. Left her a  with their phone number to follow up.

## 2021-10-21 NOTE — PROGRESS NOTES
CLINICAL PHARMACY NOTE: MEDS TO BEDS    Total # of Prescriptions Filled: 3   The following medications were delivered to the patient:  · eliquis 5mg  · Ezetimibe 10mg  · Rosuvastatin 5mg    Additional Documentation:    Delivered to Aisha Pardo RN 10/21@ 4:30. Spoke to pt's family about statin allergy on chart and family member stated that the pt has had a reaction to 8 different cholesterol meds, including ezetimibe, but that she has been on ezetimibe and rosuvastatin in hospital and they have been monitoring her closely. Also notified RN of statin allergy on file and was told as long as pt has been taking inpatient it is ok.

## 2021-10-22 ENCOUNTER — TELEPHONE (OUTPATIENT)
Dept: NEUROLOGY | Age: 81
End: 2021-10-22

## 2021-10-22 NOTE — PROGRESS NOTES
PROGRESS NOTE       PATIENT PROBLEM LIST:  Principal Problem:    TIA (transient ischemic attack)  Active Problems:    Status post mitral valve repair    History of mitral valve prolapse in adulthood    Presence of permanent cardiac pacemaker    Osteoporosis    Hypothyroidism    Anxiety and depression    Middle cerebral artery stenosis    Vitamin D deficiency    History of atrial fibrillation    History of coronary artery stent placement    Acute ischemic stroke ([facility] [address]  Resolved Problems:    * No resolved hospital problems. *      SUBJECTIVE:  Nasra Serrano appears somewhat more alert today and spontaneous in her speech which is still not clear to understand. REVIEW OF SYSTEMS:  General ROS: negative for - fatigue, malaise,  weight gain or weight loss  Psychological ROS: positive for - anxiety , depression  Ophthalmic ROS: negative for - decreased vision or visual distortion. ENT ROS: negative  Allergy and Immunology ROS: negative  Hematological and Lymphatic ROS: negative  Endocrine: no heat or cold intolerance and no polyphagia, polydipsia, or polyuria  Respiratory ROS: negative for - hemoptysis, pleuritic pain and shortness of breath  Cardiovascular ROS: positive for - irregular heartbeat. Gastrointestinal ROS: no abdominal pain, change in bowel habits, or black or bloody stools  Genito-Urinary ROS: no nocturia, dysuria, trouble voiding, frequency or hematuria  Musculoskeletal ROS: negative for- myalgias, arthralgias, or claudication  Neurological ROS: no TIA or stroke symptoms otherwise no significant change in symptoms or problems since yesterday as documented in previous progress notes.     SCHEDULED MEDICATIONS:      VITAL SIGNS:                                                                                                                          /64   Pulse 68   Temp 97.6 °F (36.4 °C) (Temporal)   Resp 18   Ht 5' 5\" (1.651 m)   Wt 158 lb (71.7 kg)   LMP 09/10/1979   SpO2 93%   BMI 26.29 kg/m²   Patient Vitals for the past 96 hrs (Last 3 readings):   Weight   10/20/21 0449 158 lb (71.7 kg)   10/18/21 2136 160 lb (72.6 kg)     OBJECTIVE:    HEENT: PERRL, EOM  Intact; sclera non-icteric, conjunctiva pink. Carotids are brisk in upstroke with normal contour. No carotid bruits. Normal jugular venous pulsation at 45°. No palpable cervical nor supraclavicular nodes. Thyroid not palpable. Trachea midline. Chest: Even excursion  Lungs: CTA B, no expiratory wheezes or rhonchi, no decreased tactile fremitus without inspiratory rales. Heart: Regular  rhythm; S1 > S2, no gallop grade 2/6 systolic murmur left mid lower sternal border. No clicks, rub, palpable thrills   or heaves. PMI nondisplaced, 5th intercostal space MCL. Abdomen: Soft, nontender, nondistended,  moderately protuberant, no masses or organomegaly. Bowel sounds active. Extremities: Without clubbing, cyanosis or edema. Pulses present 3+ upper extermities bilaterally; present 1+ DP and present 1+ PT bilaterally. Data:   Scheduled Meds: Reviewed  Continuous Infusions:       Intake/Output Summary (Last 24 hours) at 10/22/2021 0211  Last data filed at 10/21/2021 1200  Gross per 24 hour   Intake 360 ml   Output    Net 360 ml     CBC:   Recent Labs     10/19/21  1022   WBC 6.0   HGB 13.2   HCT 41.3        BMP:  Recent Labs     10/19/21  1022      K 4.1   *   CO2 20*   BUN 11   CREATININE 0.5   LABGLOM >60     ABGs: No results found for: PH, PO2, PCO2  INR: No results for input(s): INR in the last 72 hours.   PRO-BNP:   Lab Results   Component Value Date    PROBNP 345 09/14/2015    PROBNP 327 07/18/2014      TSH:   Lab Results   Component Value Date    TSH 9.800 (H) 09/14/2015      Cardiac Injury Profile:   Lab Results   Component Value Date    CKTOTAL 86 09/14/2015    TROPHS 8 10/19/2021      Lipid Profile:   Lab Results   Component Value Date    TRIG 131 10/19/2021    HDL 54 10/19/2021    LDLCALC 148 10/19/2021    CHOL 228 10/19/2021      Hemoglobin A1C: No components found for: HGBA1C      RAD:   CTA NECK W CONTRAST    Result Date: 10/19/2021  EXAMINATION: CTA OF THE NECK; CTA OF THE HEAD WITH CONTRAST 10/18/2021 11:43 pm: TECHNIQUE: CTA of the neck was performed with the administration of intravenous contrast. Multiplanar reformatted images are provided for review. MIP images are provided for review. Stenosis of the internal carotid arteries measured using NASCET criteria. Dose modulation, iterative reconstruction, and/or weight based adjustment of the mA/kV was utilized to reduce the radiation dose to as low as reasonably achievable.; CTA of the head/brain was performed with the administration of intravenous contrast. Multiplanar reformatted images are provided for review. MIP images are provided for review. Dose modulation, iterative reconstruction, and/or weight based adjustment of the mA/kV was utilized to reduce the radiation dose to as low as reasonably achievable. Noncontrast CT of the head with reconstructed 2-D images are also provided for review. COMPARISON: None. HISTORY: ORDERING SYSTEM PROVIDED HISTORY: brief change in mental status 230pm TECHNOLOGIST PROVIDED HISTORY: Reason for exam:->brief change in mental status 230pm Has a \"code stroke\" or \"stroke alert\" been called? ->No Decision Support Exception - unselect if not a suspected or confirmed emergency medical condition->Emergency Medical Condition (MA) FINDINGS: CT HEAD: BRAIN/VENTRICLES:  No acute intracranial hemorrhage or extraaxial fluid collection. Grey-white differentiation is maintained. No evidence of mass, mass effect or midline shift. No evidence of hydrocephalus. Symmetric generalized cerebral and cerebellar volume loss. Atherosclerosis. ORBITS: The visualized portion of the orbits demonstrate no acute abnormality. SINUSES:  The visualized paranasal sinuses and mastoid air cells demonstrate no acute abnormality.  SOFT TISSUES/SKULL: No acute arteries per NASCET criteria. Bilateral carotid bulb atherosclerotic plaque. 4. Patent bilateral vertebral arteries. Follow-up brain MRI and/or conventional catheter angiogram may be helpful for further evaluation. CTA HEAD W CONTRAST    Result Date: 10/19/2021  EXAMINATION: CTA OF THE NECK; CTA OF THE HEAD WITH CONTRAST 10/18/2021 11:43 pm: TECHNIQUE: CTA of the neck was performed with the administration of intravenous contrast. Multiplanar reformatted images are provided for review. MIP images are provided for review. Stenosis of the internal carotid arteries measured using NASCET criteria. Dose modulation, iterative reconstruction, and/or weight based adjustment of the mA/kV was utilized to reduce the radiation dose to as low as reasonably achievable.; CTA of the head/brain was performed with the administration of intravenous contrast. Multiplanar reformatted images are provided for review. MIP images are provided for review. Dose modulation, iterative reconstruction, and/or weight based adjustment of the mA/kV was utilized to reduce the radiation dose to as low as reasonably achievable. Noncontrast CT of the head with reconstructed 2-D images are also provided for review. COMPARISON: None. HISTORY: ORDERING SYSTEM PROVIDED HISTORY: brief change in mental status 230pm TECHNOLOGIST PROVIDED HISTORY: Reason for exam:->brief change in mental status 230pm Has a \"code stroke\" or \"stroke alert\" been called? ->No Decision Support Exception - unselect if not a suspected or confirmed emergency medical condition->Emergency Medical Condition (MA) FINDINGS: CT HEAD: BRAIN/VENTRICLES:  No acute intracranial hemorrhage or extraaxial fluid collection. Grey-white differentiation is maintained. No evidence of mass, mass effect or midline shift. No evidence of hydrocephalus. Symmetric generalized cerebral and cerebellar volume loss. Atherosclerosis.  ORBITS: The visualized portion of the orbits demonstrate no acute bilateral middle cerebral artery branches are patent. 3. No hemodynamically significant stenosis in the bilateral cervical internal carotid arteries per NASCET criteria. Bilateral carotid bulb atherosclerotic plaque. 4. Patent bilateral vertebral arteries. Follow-up brain MRI and/or conventional catheter angiogram may be helpful for further evaluation. EKG: See Report  Echo: See Report      IMPRESSIONS:  Principal Problem:    TIA (transient ischemic attack)  Active Problems:    Status post mitral valve repair    History of mitral valve prolapse in adulthood    Presence of permanent cardiac pacemaker    Osteoporosis    Hypothyroidism    Anxiety and depression    Middle cerebral artery stenosis    Vitamin D deficiency    History of atrial fibrillation    History of coronary artery stent placement    Acute ischemic stroke ([facility] [address]  Resolved Problems:    * No resolved hospital problems. *      RECOMMENDATIONS:  Maintain anticoagulation with apixaban 5 mg twice a day as well as dofetilide to reduce exposure to cardiac arrhythmia which has been documented upon interrogation of her pulse generator following admission. Speech therapy. Discharge with close supervision at home in hopes that she will regain full speech as I suspect she will. I have spent more than 25 minutes face to face with Hamida Orta and reviewing notes and laboratory data, with greater than 50% of this time instructing and counseling the patient with family members present face to face regarding my findings and recommendations and I have answered all questions as posed to me by Ms. Marlena Gibbs  and her family members present. Onofre Cr,  FACP,FACC,FSCAI      NOTE:  This report was transcribed using voice recognition software.   Every effort was made to ensure accuracy; however, inadvertent computerized transcription errors may be present

## 2021-10-29 ENCOUNTER — OFFICE VISIT (OUTPATIENT)
Dept: NEUROLOGY | Age: 81
End: 2021-10-29
Payer: MEDICARE

## 2021-10-29 VITALS — RESPIRATION RATE: 18 BRPM

## 2021-10-29 DIAGNOSIS — E78.5 DYSLIPIDEMIA, GOAL LDL BELOW 70: ICD-10-CM

## 2021-10-29 DIAGNOSIS — R47.01 APHASIA: ICD-10-CM

## 2021-10-29 DIAGNOSIS — I63.9 ISCHEMIC STROKE (HCC): Primary | ICD-10-CM

## 2021-10-29 PROCEDURE — 99215 OFFICE O/P EST HI 40 MIN: CPT | Performed by: NURSE PRACTITIONER

## 2021-10-29 PROCEDURE — 1124F ACP DISCUSS-NO DSCNMKR DOCD: CPT | Performed by: NURSE PRACTITIONER

## 2021-10-29 RX ORDER — VITAMIN B COMPLEX
1 CAPSULE ORAL
COMMUNITY

## 2021-10-29 RX ORDER — ZINC SULFATE 50(220)MG
50 CAPSULE ORAL
COMMUNITY

## 2021-10-29 NOTE — PROGRESS NOTES
STROKE CLINIC VISIT     History of Present Illness:     Ally Roblero is a 79 y. o.right handed female who presents following recent hospitalization for acute ischemic stroke. She has a significant past medical history of arthritis, hyperlipidemia, atrial fibrillation maintained on Eliquis 2.5 mg twice daily prior to hospitalization, mitral regurgitation status post mitral valve repair in 2008 with subsequent permanent pacemaker placement. She presented to the hospitals on 10/18/21 with confusion and speech difficulties noted by her family. CT of the head showed no acute intracranial findings. CTA of the head and neck showed high-grade stenosis of bilateral MCA M1 segments felt to be chronic. She was unable to undergo MRI secondary to pacemaker. A1c 5.7, . She had a previous intolerance to statins due to myalgias therefore she was started on low-dose Crestor and Zetia. She was evaluated by cardiology and her Eliquis was increased to 5 mg twice daily dosing. Aspirin 81 mg daily was added. She was stable and discharged home on 10/21/21. She continues to have word finding difficulties. She denies any new neurologic deficits. She is not currently using any assistive devices. She remains on Eliquis and aspirin without any reported ill effects. She continues on rosuvastatin 5 mg daily and ezetimibe 10 mg daily and denies myalgias. She has since followed with her cardiologist and underwent pace interrogation noting she was pacing more often recently.      Past Medical History:         Diagnosis Date    Arthritis     osteoporosis and degenerative     Heart attack [facility]) 2006    Hyperlipidemia         Past Surgical History:         Procedure Laterality Date    APPENDECTOMY  2009    ARM DEBRIDEMENT  1998    HYSTERECTOMY  9/26/79    [facility]    MITRAL VALVE REPLACEMENT  2008    PACEMAKER PLACEMENT  2008    PGR with Dr Burton Hernandez device 7/11/14    SHOULDER SURGERY      BENIGN BONE TUMOR      Current Functional Status(Modified [place] Scale):  1=No significant disability despite symptoms; able to carry out all usual duties and activities    Review of Systems:     No chest pain, palpitations or shortness of breath  No vertigo, lightheadedness or loss of consciousness  No falls, tripping or stumbling  No incontinence of bowels or bladder  No numbness, tingling or focal arm/leg weakness  No swallowing or speech difficulties  No blurred vision, double vision or vision loss     ROS otherwise negative    Objective:     LMP 09/10/1979     General Appearance: alert, cooperative, no distress, appears stated age    Head: normocephalic, without obvious abnormality, atraumatic    Eyes: conjunctiva/corneas clear    Neck: supple, symmetrical, trachea midline, no carotid bruit    Lungs: clear to auscultation bilaterally, respirations unlabored    Heart: regular rate and rhythm, S1 and S2 normal   Extremities: normal, atraumatic, no cyanosis or edema   Pulses: 2+ and symmetric all extremities   Skin: color, texture and turgor normal, no rashes or lesions     Mental Status: alert and oriented, thought content appropriate. Follows simple commands correctly with some hesitation at times.   Speech: intermittent dysfluency noted   Language: no aphasia    Cranial Nerves:  I: smell NA   II: visual acuity  NA   II: visual fields Full    II: pupils Equal and reactive    III,VII: ptosis None   III,IV,VI: extraocular muscles  Full ROM without nystagmus   V: mastication Normal   V: facial light touch sensation  Normal   V,VII: corneal reflex     VII: facial muscle function - upper  Normal   VII: facial muscle function - lower Normal   VIII: hearing Normal   IX: soft palate elevation  Normal   IX,X: gag reflex    XI: trapezius strength  5/5   XI: sternocleidomastoid strength 5/5   XI: neck extension strength  5/5   XII: tongue strength  Normal     Motor:  5/5 throughout  No abnormal movements  No drift   Normal bulk and tone     Sensory:  LT intact throughout    Coordination:   FN, FFM and TANISHA symmetric  HS normal    Gait:  Normal      Laboratory/Radiology:     CBC:   Lab Results   Component Value Date    WBC 6.0 10/19/2021    RBC 4.40 10/19/2021    HGB 13.2 10/19/2021    HCT 41.3 10/19/2021    MCV 93.9 10/19/2021    MCH 30.0 10/19/2021    MCHC 32.0 10/19/2021    RDW 14.6 10/19/2021     10/19/2021    MPV 10.8 10/19/2021     CMP:    Lab Results   Component Value Date     10/19/2021    K 4.1 10/19/2021     10/19/2021    CO2 20 10/19/2021    BUN 11 10/19/2021    CREATININE 0.5 10/19/2021    GFRAA >60 10/19/2021    LABGLOM >60 10/19/2021    GLUCOSE 176 10/19/2021    PROT 7.4 10/18/2021    LABALBU 5.0 10/18/2021    CALCIUM 8.8 10/19/2021    BILITOT 0.3 10/18/2021    ALKPHOS 66 10/18/2021    AST 20 10/18/2021    ALT 8 10/18/2021     HgBA1c:    Lab Results   Component Value Date    LABA1C 5.7 10/19/2021     FLP:    Lab Results   Component Value Date    TRIG 131 10/19/2021    HDL 54 10/19/2021    LDLCALC 148 10/19/2021    LABVLDL 26 10/19/2021     CTA head/neck 10/19/2021  1. No acute intracranial hemorrhage or mass effect. 2. 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  Focal calcified atherosclerotic plaque in the left middle   cerebral artery M1 segment.  Otherwise, the bilateral middle cerebral artery   branches are patent. 3. No hemodynamically significant stenosis in the bilateral cervical internal   carotid arteries per NASCET criteria.  Bilateral carotid bulb atherosclerotic   plaque. 4. Patent bilateral vertebral arteries. Follow-up brain MRI and/or conventional catheter angiogram may be helpful for   further evaluation. Echo with bubble 10/23/2021   Summary   Left ventricle grossly normal in size. Normal LV segmental wall motion. Normal left ventricular wall thickness. Estimated left ventricular ejection fraction is 60±5%.    Cannot accurately characterize diastolic function secondary to mitral   valve disease. The LAESV Index is >34 ml/m2. Normal right ventricular size. TAPSE is low normal   Status - post mitral annular ring insertion with minimal pseudo mitral   stenosis. Negative bubble study. Trace-mild aortic regurgitation is noted. Physiologic and/or trace pulmonic regurgitation present. Physiologic and/or trace tricuspid regurgitation. RVSP is 35 mmHg. Technically good quality study. No previous echo for comparison. Suggest clinical correlation. * Images and labs personally reviewed at the time of this office visit    Assessment/Plan     Acute ischemic stroke suspected in the left hemisphere producing her mixed aphasia likely secondary to symptomatic intracranial stenosis. Imaging reveals high grade stenosis of bilateral MCAs. She remains on aspirin 81 mg daily in addition to oral anticoagulation for secondary stroke prevention. Possible mechanism could also include cardioembolism with history of atrial fibrillation on low-dose apixaban. She has additional risk factors of atrial fibrillation and hyperlipidemia. Risk Factor Modification   Atrial Fibrillation continue Eliquis 5 mg twice daily  Dyslipidemia, goal LDL less than 70: , continue rosuvastatin and zetia as tolerated, recommend repeating FLP in 4-12 weeks to evaluate current management     Follow up with neurology in 3-4 months  Follow up with PCP as planned  Refer to speech therapy    DELIA Erazo CNP    8:05 AM  10/29/21    I spent 40 minutes with this patient obtaining the HPI and discussing test results and exam with 50% of the visit counseling the patient and/or family on diagnosis, risk factor modification and our plan of action. All questions were answered prior to leaving my office.

## 2021-11-04 ENCOUNTER — HOSPITAL ENCOUNTER (OUTPATIENT)
Dept: SPEECH THERAPY | Age: 81
Setting detail: THERAPIES SERIES
Discharge: HOME | End: 2021-11-04
Payer: MEDICARE

## 2021-11-04 PROCEDURE — 92523 SPEECH SOUND LANG COMPREHEN: CPT

## 2021-11-04 NOTE — PROGRESS NOTES
SPEECH/LANGUAGE PATHOLOGY  SPEECH/LANGUAGE/COGNITIVE EVALUATION   and PLAN OF CARE      PATIENT NAME:  Meche Alcantar  (female)     MRN:  [id number]    :  1940  (79 y.o.)  STATUS:  Outpatient clinic   TODAY'S DATE:  2021  REFERRING PROVIDER:    LINDSAY Diaz   SPECIFIC PROVIDER ORDER: Speech language pathology evaluation  Date of order:  10/29/21  REASON FOR REFERRAL:  Assess Language   EVALUATING THERAPIST: LEVI Jay  CERTIFICATION/RECERTIFICATION PERIOD: 2021 to 22  REFERRING DIAGNOSIS: Cerebral infarction, unspecified [I63.9]  Aphasia [R47.01]  Hyperlipidemia, unspecified [E78.5]      SPEECH THERAPY  PLAN OF CARE   The speech therapy  POC is established based on physician order, speech pathology diagnosis and results of clinical assessment     Patient accompanied to today's evaluation by her son-in-law, Javed Ramon. An , Liane Callejas #[id number],  Was present upon patient entering the room. Once patient entered the room, patient understood and spoke [place] in addition to [place]. Patient let SLP and  know she would be able to understand and respond to evaluation without .  encouraged to call back if an difficulties arose.  was not needed or used for the remaining duration of the session. Both patient and son-in-law report patient has returned to baseline. Patient reports she is back to baseline; functioning as she was before her stoke. Patient and son-in-law report she is able to answer questions correctly, follow directions (e.g., recipe), remember information/names/phone numbers/recipes/etc. Patient and son-in-law had no concerns regarding patient's current speech, language, and cognition. SPEECH PATHOLOGY DIAGNOSIS:   Within functional limits    Speech Pathology intervention is not warranted at this time.     Conditions Requiring Skilled Therapeutic Intervention for speech, language and/or hospitalization    Memory   Immediate Recall: Repeated 3/3    Delayed Recall:   Recalled 3/3    Long Term Recall:   Recalled Address, Birthdate, Age and Family    Organization/Problem Solving/Reasoning   Verbal Sequencing:   Functional        Verbal Problem solving:   Functional          CLINICAL OBSERVATIONS NOTED DURING THE EVALUATION  Within functional limits                  EDUCATION:   The Speech Language Pathologist (SLP) completed education regarding results of evaluation and that intervention is not warranted at this time. Learner: Patient and Family  Education: Reviewed results and recommendations of this evaluation  Evaluation of Education:  Sharmila Garcia understanding    This plan may be re-evaluated and revised as warranted. Treatment goals discussed with Patient and Family   The Patient and Family understand(s) the diagnosis, prognosis and plan of care     Evaluation Time includes thorough review of current medical information, gathering information on past medical history/social history and prior level of function, completion of standardized testing/informal observation of tasks, assessment of data and education on plan of care and goals. CPT Codes    Evaluation: 77817 Evaluation of Speech Sound Language Comprehension     61 Minutes         The admitting diagnosis and active problem list, as listed below have been reviewed prior to initiation of this evaluation.         ACTIVE PROBLEM LIST:   Patient Active Problem List   Diagnosis    Chest discomfort    Status post mitral valve repair    History of mitral valve prolapse in adulthood    Sinus node dysfunction (HCC)    Presence of permanent cardiac pacemaker    Osteoporosis    Osteoarthritis    Cervicalgia    Dyspnea on effort    Tachycardia    Hypothyroidism    Senile osteoporosis    TIA (transient ischemic attack)    Anxiety and depression    Atrial fibrillation ([address]    Middle cerebral artery stenosis    Vitamin D deficiency  History of atrial fibrillation    History of coronary artery stent placement    Acute ischemic stroke ([facility] [address]       L[phone]  11/4/2021      [address]        Phone: [phone]     If you have any questions or concerns, please don't hesitate to call. Thank you for your referral.    Physician/Provider Signature:________________________________Date:__________________  By signing above, the therapists plan is approved by the physician/provider. All patients under [facility] must be signed by physician/provider.

## 2021-11-13 NOTE — DISCHARGE SUMMARY
Physician Discharge Summary     Patient ID:  Сергей Grande  [id number]  79 y.o.  1940    Admit date: 10/18/2021    Discharge date and time: 10/21/2021    Admission Diagnoses:   Patient Active Problem List   Diagnosis    Chest discomfort    Status post mitral valve repair    History of mitral valve prolapse in adulthood    Sinus node dysfunction (HCC)    Presence of permanent cardiac pacemaker    Osteoporosis    Osteoarthritis    Cervicalgia    Dyspnea on effort    Tachycardia    Hypothyroidism    Senile osteoporosis    TIA (transient ischemic attack)    Anxiety and depression    Atrial fibrillation ([address]    Middle cerebral artery stenosis    Vitamin D deficiency    History of atrial fibrillation    History of coronary artery stent placement    Acute ischemic stroke [facility])       Discharge Diagnoses: TIA    Consults: cardiology and neurology    Procedures: None    Hospital Course: The patient is a 79 y.o. female of Lashonda Hunt MD  who presents with stroke like symptoms. Admit to telemetry for concern of TIA symptoms  No radiological data to confirm stroke  Unable to obtain MRI secondary to incompatible pacemaker in place  Continue Eliquis for A. fib stroke preventionwould consider increasing her Eliquis to 5 p.o. twice dailyunclear why she is on 2.5 twice daily as her renal function is normal  Discussed with Dr. Collette Clay consultation and input  Patient did have a flutter October 10 on pacer interrogation and now currently with SVTmanagement per cardiologydiscussed with Dr. Ester Lucas  start aspirin 81 daily, in addition to full anticoagulation due to microvascular disease at advanced age  High intensity statin  Check lipid panel 228 total cholesterol in 148 LDL and hemoglobin A1cZetia added  Blood pressure control  Neurology consultedinput appreciated     Patient discharged home in stable conditions with medications listed below.   Patient instructed to follow up with all advised to: Follow-up with Rodrigo Conway MD in 1 week.   Follow-up with consultants as recommended by them    Note that over 30 minutes was spent in preparing discharge papers, discussing discharge with patient, medication review, etc.    Signed:  Christiane Leonardo MD  11/13/2021  11:44 AM

## 2022-02-02 ENCOUNTER — OFFICE VISIT (OUTPATIENT)
Dept: NEUROLOGY | Age: 82
End: 2022-02-02
Payer: MEDICARE

## 2022-02-02 VITALS
HEIGHT: 66 IN | OXYGEN SATURATION: 94 % | BODY MASS INDEX: 25.71 KG/M2 | TEMPERATURE: 98 F | HEART RATE: 70 BPM | WEIGHT: 160 LBS | DIASTOLIC BLOOD PRESSURE: 72 MMHG | SYSTOLIC BLOOD PRESSURE: 132 MMHG

## 2022-02-02 DIAGNOSIS — I63.9 ISCHEMIC STROKE (HCC): Primary | ICD-10-CM

## 2022-02-02 PROCEDURE — 99213 OFFICE O/P EST LOW 20 MIN: CPT | Performed by: PHYSICIAN ASSISTANT

## 2022-02-02 NOTE — PROGRESS NOTES
Viviane Higgins is a 79 y.o.  female     Neurology is following for stroke. Past medical history significant for arthritis, hyperlipidemia, A. Fib (was on Eliquis 2.5 mg twice daily PTA), mitral regurgitation status post mitral valve repair 2008 with subsequent permanent pacemaker insertion. She presents for follow-up of stroke that occurred in October 2021. She was seen by our service in the hospital.  During that admission she had presented with confusion and increased speech difficulties. CTA of the head and neck revealed high-grade stenosis of the bilateral MCA M1 segments which was felt to maybe have been chronic. There was also focal calcified atherosclerotic plaque in the left MCA M1 segment. There is no significant stenosis in cervical ICAs but bilateral carotid bulb atherosclerotic plaque was noted. Unfortunately, she cannot have an MRI due to her pacemaker. Her HA1C was 5.7, . During her admission Crestor was added in addition to her Zetia. She did have a previous intolerance to statins. She is currently tolerating low-dose. She was on Eliquis 2.5 mg twice daily for her A. fib and this was increased to 5 mg twice daily. Since leaving the hospital she feels that she is improving slowly. Denies any new stroke symptoms. Allergies   Allergen Reactions    Latex     Lipitor [Atorvastatin]     Tricor [Fenofibrate]     Zocor [Simvastatin]        Objective:       /72 (Site: Right Upper Arm)   Pulse 70   Temp 98 °F (36.7 °C)   Ht 5' 6\" (1.676 m)   Wt 160 lb (72.6 kg)   LMP 09/10/1979   SpO2 94%   BMI 25.82 kg/m²        General appearance: alert, appears stated age and cooperative  Head: Normocephalic, without obvious abnormality, atraumatic  Eyes: conjunctivae/corneas clear.    Neck: no adenopathy, no carotid bruit, no JVD, supple, symmetrical, trachea midline and thyroid not enlarged, symmetric, no tenderness/mass/nodules  Lungs: clear to auscultation bilaterally  Extremities: extremities normal, atraumatic, no cyanosis or edema  Skin: Skin color, texture, turgor normal. No rashes or lesions     Mental Status: Alert. Follows simple commands fairly well today. Still with some mixed aphasia but improving.     Cranial Nerves:  I: smell    II: visual acuity     II: visual fields Full to confrontation   II: pupils HEIDI   III,VII: ptosis None   III,IV,VI: extraocular muscles  Full ROM   V: mastication Normal   V: facial light touch sensation  Normal   V,VII: corneal reflex     VII: facial muscle function - upper  Normal   VII: facial muscle function - lower Normal   VIII: hearing Normal   IX: soft palate elevation  Normal   IX,X: gag reflex    XI: trapezius strength  5/5   XI: sternocleidomastoid strength 5/5   XI: neck extension strength  5/5   XII: tongue strength  Normal     Motor:  5/5 throughout  Normal tone and bulk   No abnormal movements     Sensory:  LT normal distally in all limbs    Coordination:   FFM intact bilaterally    DTR:     No Fields    Laboratory/Radiology:     CBC with Differential:    Lab Results   Component Value Date    WBC 6.0 10/19/2021    RBC 4.40 10/19/2021    HGB 13.2 10/19/2021    HCT 41.3 10/19/2021     10/19/2021    MCV 93.9 10/19/2021    MCH 30.0 10/19/2021    MCHC 32.0 10/19/2021    RDW 14.6 10/19/2021    LYMPHOPCT 23.6 10/19/2021    MONOPCT 6.0 10/19/2021    BASOPCT 0.7 10/19/2021    MONOSABS 0.36 10/19/2021    LYMPHSABS 1.41 10/19/2021    EOSABS 0.10 10/19/2021    BASOSABS 0.04 10/19/2021     CMP:    Lab Results   Component Value Date     10/19/2021    K 4.1 10/19/2021     10/19/2021    CO2 20 10/19/2021    BUN 11 10/19/2021    CREATININE 0.5 10/19/2021    GFRAA >60 10/19/2021    LABGLOM >60 10/19/2021    GLUCOSE 176 10/19/2021    PROT 7.4 10/18/2021    LABALBU 5.0 10/18/2021    CALCIUM 8.8 10/19/2021    BILITOT 0.3 10/18/2021    ALKPHOS 66 10/18/2021    AST 20 10/18/2021    ALT 8 10/18/2021     HgBA1c:    Lab Results   Component Value Date    LABA1C 5.7 10/19/2021     FLP:    Lab Results   Component Value Date    TRIG 131 10/19/2021    HDL 54 10/19/2021    LDLCALC 148 10/19/2021    LABVLDL 26 10/19/2021      CTA head/neck  1. No acute intracranial hemorrhage or mass effect. 2. High-grade stenosis of the bilateral middle cerebral artery M1 segments  may be chronic.  Focal calcified atherosclerotic plaque in the left middle  cerebral artery M1 segment.  Otherwise, the bilateral middle cerebral artery  branches are patent. 3. No hemodynamically significant stenosis in the bilateral cervical internal  carotid arteries per NASCET criteria.  Bilateral carotid bulb atherosclerotic  plaque. 4. Patent bilateral vertebral arteries. Follow-up brain MRI and/or conventional catheter angiogram may be helpful for  further evaluation. Echo   Left ventricle grossly normal in size. Normal LV segmental wall motion. Normal left ventricular wall thickness. Estimated left ventricular ejection fraction is 60±5%. Cannot accurately characterize diastolic function secondary to mitral   valve disease. The LAESV Index is >34 ml/m2. Normal right ventricular size. TAPSE is low normal   Status - post mitral annular ring insertion with minimal pseudo mitral   stenosis. Negative bubble study. Trace-mild aortic regurgitation is noted. Physiologic and/or trace pulmonic regurgitation present. Physiologic and/or trace tricuspid regurgitation. RVSP is 35 mmHg. Technically good quality study. No previous echo for comparison. Suggest clinical correlation.     I personally reviewed all labs and images today   Assessment:     Suspected L hemispheric stroke causing a mixed aphasia-- mechanism could be either cardio-embolic from known history of afib vs related to significant intracranial atherosclerosis of the MCAs    Plan:     Continue Eliquis    Crestor and Zetia     Risk factor modification     Return to office in 6 months or sooner as needed    Call with questions or concerns      Alecia Feliz PA-C  1:17 PM  2/2/2022

## 2022-03-30 ENCOUNTER — HOSPITAL ENCOUNTER (INPATIENT)
Age: 82
LOS: 2 days | Discharge: HOME | DRG: 310 | End: 2022-04-01
Attending: EMERGENCY MEDICINE | Admitting: INTERNAL MEDICINE
Payer: MEDICARE

## 2022-03-30 ENCOUNTER — APPOINTMENT (OUTPATIENT)
Dept: GENERAL RADIOLOGY | Age: 82
DRG: 310 | End: 2022-03-30
Payer: MEDICARE

## 2022-03-30 DIAGNOSIS — R00.2 PALPITATIONS: Primary | ICD-10-CM

## 2022-03-30 DIAGNOSIS — R07.9 EXERTIONAL CHEST PAIN: ICD-10-CM

## 2022-03-30 PROBLEM — I48.91 ATRIAL FIBRILLATION WITH RAPID VENTRICULAR RESPONSE (HCC): Status: ACTIVE | Noted: 2022-03-30

## 2022-03-30 LAB
ALBUMIN SERPL-MCNC: 4.1 G/DL (ref 3.5–5.2)
ALP BLD-CCNC: 61 U/L (ref 35–104)
ALT SERPL-CCNC: 30 U/L (ref 0–32)
ANION GAP SERPL CALCULATED.3IONS-SCNC: 10 MMOL/L (ref 7–16)
APTT: 41.2 SEC (ref 24.5–35.1)
AST SERPL-CCNC: 70 U/L (ref 0–31)
BASOPHILS ABSOLUTE: 0.04 E9/L (ref 0–0.2)
BASOPHILS RELATIVE PERCENT: 0.8 % (ref 0–2)
BILIRUB SERPL-MCNC: 0.5 MG/DL (ref 0–1.2)
BUN BLDV-MCNC: 15 MG/DL (ref 6–23)
CALCIUM SERPL-MCNC: 9 MG/DL (ref 8.6–10.2)
CHLORIDE BLD-SCNC: 104 MMOL/L (ref 98–107)
CO2: 24 MMOL/L (ref 22–29)
CREAT SERPL-MCNC: 0.7 MG/DL (ref 0.5–1)
EKG ATRIAL RATE: 113 BPM
EKG Q-T INTERVAL: 390 MS
EKG QRS DURATION: 150 MS
EKG QTC CALCULATION (BAZETT): 518 MS
EKG R AXIS: 96 DEGREES
EKG T AXIS: -69 DEGREES
EKG VENTRICULAR RATE: 106 BPM
EOSINOPHILS ABSOLUTE: 0.1 E9/L (ref 0.05–0.5)
EOSINOPHILS RELATIVE PERCENT: 1.9 % (ref 0–6)
GFR AFRICAN AMERICAN: >60
GFR NON-AFRICAN AMERICAN: >60 ML/MIN/1.73
GLUCOSE BLD-MCNC: 161 MG/DL (ref 74–99)
HCT VFR BLD CALC: 42.3 % (ref 34–48)
HEMOGLOBIN: 13.5 G/DL (ref 11.5–15.5)
IMMATURE GRANULOCYTES #: 0.01 E9/L
IMMATURE GRANULOCYTES %: 0.2 % (ref 0–5)
INR BLD: 1.6
LYMPHOCYTES ABSOLUTE: 1.12 E9/L (ref 1.5–4)
LYMPHOCYTES RELATIVE PERCENT: 21.3 % (ref 20–42)
MCH RBC QN AUTO: 30.1 PG (ref 26–35)
MCHC RBC AUTO-ENTMCNC: 31.9 % (ref 32–34.5)
MCV RBC AUTO: 94.2 FL (ref 80–99.9)
MONOCYTES ABSOLUTE: 0.32 E9/L (ref 0.1–0.95)
MONOCYTES RELATIVE PERCENT: 6.1 % (ref 2–12)
NEUTROPHILS ABSOLUTE: 3.66 E9/L (ref 1.8–7.3)
NEUTROPHILS RELATIVE PERCENT: 69.7 % (ref 43–80)
PDW BLD-RTO: 13.9 FL (ref 11.5–15)
PLATELET # BLD: 109 E9/L (ref 130–450)
PMV BLD AUTO: 11.2 FL (ref 7–12)
POTASSIUM REFLEX MAGNESIUM: 3.8 MMOL/L (ref 3.5–5)
PRO-BNP: 1535 PG/ML (ref 0–450)
PROTHROMBIN TIME: 17.5 SEC (ref 9.3–12.4)
RBC # BLD: 4.49 E12/L (ref 3.5–5.5)
SODIUM BLD-SCNC: 138 MMOL/L (ref 132–146)
TOTAL PROTEIN: 6.7 G/DL (ref 6.4–8.3)
TROPONIN, HIGH SENSITIVITY: 12 NG/L (ref 0–9)
TROPONIN, HIGH SENSITIVITY: 13 NG/L (ref 0–9)
WBC # BLD: 5.3 E9/L (ref 4.5–11.5)

## 2022-03-30 PROCEDURE — 93005 ELECTROCARDIOGRAM TRACING: CPT | Performed by: INTERNAL MEDICINE

## 2022-03-30 PROCEDURE — 71045 X-RAY EXAM CHEST 1 VIEW: CPT

## 2022-03-30 PROCEDURE — 93005 ELECTROCARDIOGRAM TRACING: CPT | Performed by: EMERGENCY MEDICINE

## 2022-03-30 PROCEDURE — 85730 THROMBOPLASTIN TIME PARTIAL: CPT

## 2022-03-30 PROCEDURE — 80053 COMPREHEN METABOLIC PANEL: CPT

## 2022-03-30 PROCEDURE — 2060000000 HC ICU INTERMEDIATE R&B

## 2022-03-30 PROCEDURE — 85025 COMPLETE CBC W/AUTO DIFF WBC: CPT

## 2022-03-30 PROCEDURE — 2580000003 HC RX 258: Performed by: INTERNAL MEDICINE

## 2022-03-30 PROCEDURE — 84484 ASSAY OF TROPONIN QUANT: CPT

## 2022-03-30 PROCEDURE — 85610 PROTHROMBIN TIME: CPT

## 2022-03-30 PROCEDURE — 93010 ELECTROCARDIOGRAM REPORT: CPT | Performed by: INTERNAL MEDICINE

## 2022-03-30 PROCEDURE — 6370000000 HC RX 637 (ALT 250 FOR IP): Performed by: INTERNAL MEDICINE

## 2022-03-30 PROCEDURE — 83880 ASSAY OF NATRIURETIC PEPTIDE: CPT

## 2022-03-30 PROCEDURE — 99284 EMERGENCY DEPT VISIT MOD MDM: CPT

## 2022-03-30 RX ORDER — DIMENHYDRINATE 50 MG
100 TABLET ORAL NIGHTLY
COMMUNITY

## 2022-03-30 RX ORDER — ONDANSETRON 4 MG/1
4 TABLET, ORALLY DISINTEGRATING ORAL EVERY 8 HOURS PRN
Status: DISCONTINUED | OUTPATIENT
Start: 2022-03-30 | End: 2022-03-30

## 2022-03-30 RX ORDER — SODIUM CHLORIDE 0.9 % (FLUSH) 0.9 %
5-40 SYRINGE (ML) INJECTION PRN
Status: DISCONTINUED | OUTPATIENT
Start: 2022-03-30 | End: 2022-04-01 | Stop reason: HOSPADM

## 2022-03-30 RX ORDER — ROSUVASTATIN CALCIUM 5 MG/1
5 TABLET, COATED ORAL NIGHTLY
Status: DISCONTINUED | OUTPATIENT
Start: 2022-03-30 | End: 2022-04-01 | Stop reason: HOSPADM

## 2022-03-30 RX ORDER — NADOLOL 20 MG/1
10 TABLET ORAL DAILY PRN
COMMUNITY

## 2022-03-30 RX ORDER — ACETAMINOPHEN 650 MG/1
650 SUPPOSITORY RECTAL EVERY 6 HOURS PRN
Status: DISCONTINUED | OUTPATIENT
Start: 2022-03-30 | End: 2022-04-01 | Stop reason: HOSPADM

## 2022-03-30 RX ORDER — SODIUM CHLORIDE 0.9 % (FLUSH) 0.9 %
5-40 SYRINGE (ML) INJECTION EVERY 12 HOURS SCHEDULED
Status: DISCONTINUED | OUTPATIENT
Start: 2022-03-30 | End: 2022-04-01 | Stop reason: HOSPADM

## 2022-03-30 RX ORDER — ZINC SULFATE 50(220)MG
50 CAPSULE ORAL
Status: DISCONTINUED | OUTPATIENT
Start: 2022-03-30 | End: 2022-04-01 | Stop reason: HOSPADM

## 2022-03-30 RX ORDER — EZETIMIBE 10 MG/1
10 TABLET ORAL NIGHTLY
Status: DISCONTINUED | OUTPATIENT
Start: 2022-03-30 | End: 2022-04-01 | Stop reason: HOSPADM

## 2022-03-30 RX ORDER — POLYETHYLENE GLYCOL 3350 17 G/17G
17 POWDER, FOR SOLUTION ORAL DAILY PRN
Status: DISCONTINUED | OUTPATIENT
Start: 2022-03-30 | End: 2022-04-01 | Stop reason: HOSPADM

## 2022-03-30 RX ORDER — VITAMIN B COMPLEX
1000 TABLET ORAL 2 TIMES DAILY
Status: DISCONTINUED | OUTPATIENT
Start: 2022-03-30 | End: 2022-04-01 | Stop reason: HOSPADM

## 2022-03-30 RX ORDER — ACETAMINOPHEN 325 MG/1
650 TABLET ORAL EVERY 6 HOURS PRN
Status: DISCONTINUED | OUTPATIENT
Start: 2022-03-30 | End: 2022-04-01 | Stop reason: HOSPADM

## 2022-03-30 RX ORDER — DOFETILIDE 0.12 MG/1
125 CAPSULE ORAL ONCE
Status: COMPLETED | OUTPATIENT
Start: 2022-03-30 | End: 2022-03-30

## 2022-03-30 RX ORDER — ONDANSETRON 2 MG/ML
4 INJECTION INTRAMUSCULAR; INTRAVENOUS EVERY 6 HOURS PRN
Status: DISCONTINUED | OUTPATIENT
Start: 2022-03-30 | End: 2022-03-30

## 2022-03-30 RX ORDER — DOFETILIDE 0.25 MG/1
250 CAPSULE ORAL EVERY 12 HOURS SCHEDULED
Status: DISCONTINUED | OUTPATIENT
Start: 2022-03-30 | End: 2022-03-30

## 2022-03-30 RX ORDER — DIMENHYDRINATE 50 MG
100 TABLET ORAL NIGHTLY
Status: DISCONTINUED | OUTPATIENT
Start: 2022-03-30 | End: 2022-03-30 | Stop reason: ALTCHOICE

## 2022-03-30 RX ORDER — VITAMIN C
1 TAB ORAL
Status: DISCONTINUED | OUTPATIENT
Start: 2022-03-30 | End: 2022-04-01 | Stop reason: HOSPADM

## 2022-03-30 RX ORDER — DOFETILIDE 0.25 MG/1
250 CAPSULE ORAL EVERY 12 HOURS SCHEDULED
Status: DISCONTINUED | OUTPATIENT
Start: 2022-03-30 | End: 2022-04-01 | Stop reason: HOSPADM

## 2022-03-30 RX ORDER — SODIUM CHLORIDE 9 MG/ML
INJECTION, SOLUTION INTRAVENOUS PRN
Status: DISCONTINUED | OUTPATIENT
Start: 2022-03-30 | End: 2022-04-01 | Stop reason: HOSPADM

## 2022-03-30 RX ADMIN — Medication 1000 UNITS: at 20:39

## 2022-03-30 RX ADMIN — ROSUVASTATIN CALCIUM 5 MG: 5 TABLET, FILM COATED ORAL at 20:39

## 2022-03-30 RX ADMIN — APIXABAN 5 MG: 5 TABLET, FILM COATED ORAL at 20:39

## 2022-03-30 RX ADMIN — ZINC SULFATE 220 MG (50 MG) CAPSULE 50 MG: CAPSULE at 16:00

## 2022-03-30 RX ADMIN — Medication 10 ML: at 20:39

## 2022-03-30 RX ADMIN — Medication 1000 UNITS: at 16:00

## 2022-03-30 RX ADMIN — DOFETILIDE 125 MCG: 0.12 CAPSULE ORAL at 16:00

## 2022-03-30 RX ADMIN — APIXABAN 5 MG: 5 TABLET, FILM COATED ORAL at 16:00

## 2022-03-30 RX ADMIN — EZETIMIBE 10 MG: 10 TABLET ORAL at 20:39

## 2022-03-30 RX ADMIN — CALCIUM CARBONATE-VITAMIN D TAB 500 MG-200 UNIT 1 TABLET: 500-200 TAB at 16:00

## 2022-03-30 RX ADMIN — DOFETILIDE 250 MCG: 0.25 CAPSULE ORAL at 20:40

## 2022-03-30 ASSESSMENT — ENCOUNTER SYMPTOMS
SORE THROAT: 0
VOMITING: 0
RHINORRHEA: 0
ABDOMINAL PAIN: 0
DIARRHEA: 0
BACK PAIN: 0
SHORTNESS OF BREATH: 1
NAUSEA: 0
COUGH: 0

## 2022-03-30 NOTE — ED PROVIDER NOTES
[address]  Department of Emergency Medicine     Written by: Sushil Dodd DO  Patient Name: Marianela Fallon  Attending Provider: Ga Babcock MD  Admit Date: 3/30/2022  8:55 AM  MRN: [id number]    : 1940      Chief Complaint   Patient presents with    Atrial Fibrillation     saw dr Ethan Rachel yesterday     Chest Pain    - Chief complaint    HPI   Marianela Fallon is a 79 y.o. female presenting to the ED for evaluation of Atrial Fibrillation (saw dr Ethan Rachel yesterday ) and Chest Pain     Patient has a past medical history of atrial fibrillation s/p pacemaker insertion, on Eliquis twice daily, and also on Tikosyn; history of CAD s/p stent placement, osteoporosis, HLD. Patient is presenting for evaluation of palpitations and episodic exertional chest pain/shortness of breath over the past 2 weeks. She saw her cardiologist yesterday outpatient and was evaluated, they did plan for admission given that there were no beds available at at their chosen facility they opted to have patient come in this morning for evaluation and admission instead; per patient and her grandson, plan is likely for admission to increase her Tikosyn dose. Patient grandson presents with her at bedside and assists in providing history. Patient denies any recent medication changes and endorses medication compliance. She denies any chest pain or shortness of breath at rest but on minimal exertion she said that her palpitations worsen and she sometimes has substernal chest tightness. Patient's complaints are moderate in severity, and intermittent. Nothing in particular makes it better. Exertion makes it worse. Associated symptom includes fatigue. Denies any fevers, neck pain or stiffness, cough or sore throat, abdominal pain, nausea or vomiting, diarrhea, black or bloody stools, urinary symptoms, numbness or tingling anywhere, lower extremity edema or tenderness.     Review of Systems   Constitutional: Positive for fatigue. Negative for chills and fever. HENT: Negative for rhinorrhea and sore throat. Eyes: Negative for visual disturbance. Respiratory: Positive for shortness of breath. Negative for cough. Cardiovascular: Positive for chest pain and palpitations. Gastrointestinal: Negative for abdominal pain, diarrhea, nausea and vomiting. Endocrine: Negative for polydipsia and polyuria. Genitourinary: Negative for dysuria and frequency. Musculoskeletal: Negative for back pain and myalgias. Skin: Negative for rash and wound. Neurological: Negative for weakness and headaches. Psychiatric/Behavioral: Negative for confusion. All other systems reviewed and are negative. Physical Exam  Vitals and nursing note reviewed. Constitutional:       General: She is not in acute distress. Appearance: She is not toxic-appearing. HENT:      Head: Normocephalic and atraumatic. Right Ear: External ear normal.      Left Ear: External ear normal.      Nose: Nose normal. No rhinorrhea. Mouth/Throat:      Mouth: Mucous membranes are moist.      Pharynx: Oropharynx is clear. Eyes:      Extraocular Movements: Extraocular movements intact. Conjunctiva/sclera: Conjunctivae normal.      Pupils: Pupils are equal, round, and reactive to light. Cardiovascular:      Rate and Rhythm: Regular rhythm. Tachycardia present. Pulses: Normal pulses. Heart sounds: Normal heart sounds. Pulmonary:      Effort: Pulmonary effort is normal. No respiratory distress. Breath sounds: Normal breath sounds. No wheezing or rales. Abdominal:      General: Bowel sounds are normal.      Palpations: Abdomen is soft. Tenderness: There is no abdominal tenderness. There is no guarding. Musculoskeletal:         General: Normal range of motion. Cervical back: Normal range of motion and neck supple. Right lower leg: No edema. Left lower leg: No edema.    Skin:     General: Skin is warm and dry. Capillary Refill: Capillary refill takes less than 2 seconds. Coloration: Skin is not jaundiced or pale. Findings: No rash. Neurological:      General: No focal deficit present. Mental Status: She is alert and oriented to person, place, and time. Sensory: No sensory deficit. Motor: No weakness. Psychiatric:         Mood and Affect: Mood normal.         Behavior: Behavior normal.          Procedures       [facility]     ED Course as of 03/30/22 1754   Wed Mar 30, 2022   0925 He denies any chest pain or shortness of breath at this time. Patient states she has been having palpitations and feeling her heart racing increasing for the past week. No significant change since yesterday when she was seen at Dr. Yang Carranza office. she did feel palpitations last night but denied any chest pain or shortness of breath. He wanted to admit her for change in her Tikosyn dose and for 72-hour cardiac monitoring. [KK]   7434 Call has been placed to Dr. Antoine Schwartz for cardiology consult. [NB]   0639 Spoke with Dr. Antoine Schwartz (cardiology); wants admitted in order to increase her Tikosyn. If she did not take her Tikosyn give 250 mcg [VG]   1055 Reassessed, states that she did take her decrease in dose this morning. She states that currently she is asymptomatic and feels fine while she is at rest. [VG]   1109 Spoke with Dr. Delberta Kussmaul, discussed case, patient is accepted for admission.  [VG]   1120 Patient is presenting for evaluation of palpitations in her chest x2 weeks. She does state that she also gets chest pain and shortness of breath on exertion but that it is mostly just the palpitations that she is worried about. She was evaluated with her cardiologist yesterday for the symptoms and was found to be in atrial fibrillation, she has a longstanding history of this and is on Tikosyn and also has a pacemaker in place.   Per chart review, it appears that she has had intermittently paced versus an paced rhythms, with underlying atrial fibrillation. Her cardiologist recommended admission at [facility] yesterday but reportedly there were no beds so they were advised to come to our ER this morning for admission. Currently patient states she is asymptomatic, states that when she is at rest she feels fine but when she exerts herself that is when she feels badly. Her grandson presents with her at bedside and assists in providing history. Patient otherwise has not had any recent illnesses or other complaints. [VG]      ED Course User Index  [KK] Jennifer Cat MD  [VG] [place] [facility] DO       MDM    This is a 79 y.o. female presenting for evaluation of palpitations and exertional chest pain/shortness of breath on and off over the past 2 weeks. She saw her cardiologist yesterday. She was instructed to go to the ER for evaluation and likely for admission to adjust/increase her Tikosyn. Please see HPI for further detail. On arrival she is alert and oriented, she is in no acute distress. States that when she is at rest she feels fine, states that is mostly when she exerts herself that she starts to have symptoms. Vitals are significant for heart rate in the low 100s. EKG shows ventricular paced rhythm. EKG from yesterday at her cardiology office showed atrial flutter. EKG prior to that showed atrial paced rhythm. This was discussed closely with patient's cardiologist over the phone, he would like patient admitted for further evaluation and plans to increase her Tikosyn dose during this admission. Labs reviewed, show troponin 13, repeat is 12. proBNP is 1535; most recent BNP was normal but this was 6 years ago. Labs otherwise generally unremarkable. CXR shows no acute process. Results and plan discussed with the patient and her grandson who is at bedside. They voiced understanding and are amenable. Medicine consulted for admission, patient is accepted.     EKG reviewed and 12.0 fL    Neutrophils % 69.7 43.0 - 80.0 %    Immature Granulocytes % 0.2 0.0 - 5.0 %    Lymphocytes % 21.3 20.0 - 42.0 %    Monocytes % 6.1 2.0 - 12.0 %    Eosinophils % 1.9 0.0 - 6.0 %    Basophils % 0.8 0.0 - 2.0 %    Neutrophils Absolute 3.66 1.80 - 7.30 E9/L    Immature Granulocytes # 0.01 E9/L    Lymphocytes Absolute 1.12 (L) 1.50 - 4.00 E9/L    Monocytes Absolute 0.32 0.10 - 0.95 E9/L    Eosinophils Absolute 0.10 0.05 - 0.50 E9/L    Basophils Absolute 0.04 0.00 - 0.20 E9/L   Comprehensive Metabolic Panel w/ Reflex to MG   Result Value Ref Range    Sodium 138 132 - 146 mmol/L    Potassium reflex Magnesium 3.8 3.5 - 5.0 mmol/L    Chloride 104 98 - 107 mmol/L    CO2 24 22 - 29 mmol/L    Anion Gap 10 7 - 16 mmol/L    Glucose 161 (H) 74 - 99 mg/dL    BUN 15 6 - 23 mg/dL    CREATININE 0.7 0.5 - 1.0 mg/dL    GFR Non-African American >60 >=60 mL/min/1.73    GFR African American >60     Calcium 9.0 8.6 - 10.2 mg/dL    Total Protein 6.7 6.4 - 8.3 g/dL    Albumin 4.1 3.5 - 5.2 g/dL    Total Bilirubin 0.5 0.0 - 1.2 mg/dL    Alkaline Phosphatase 61 35 - 104 U/L    ALT 30 0 - 32 U/L    AST 70 (H) 0 - 31 U/L   Troponin   Result Value Ref Range    Troponin, High Sensitivity 13 (H) 0 - 9 ng/L   Brain Natriuretic Peptide   Result Value Ref Range    Pro-BNP 1,535 (H) 0 - 450 pg/mL   Protime-INR   Result Value Ref Range    Protime 17.5 (H) 9.3 - 12.4 sec    INR 1.6    APTT   Result Value Ref Range    aPTT 41.2 (H) 24.5 - 35.1 sec   Troponin   Result Value Ref Range    Troponin, High Sensitivity 12 (H) 0 - 9 ng/L   EKG 12 Lead   Result Value Ref Range    Ventricular Rate 106 BPM    Atrial Rate 113 BPM    QRS Duration 150 ms    Q-T Interval 390 ms    QTc Calculation (Bazett) 518 ms    R Axis 96 degrees    T Axis -69 degrees   EKG 12 Lead   Result Value Ref Range    Ventricular Rate 101 BPM    Atrial Rate 101 BPM    QRS Duration 152 ms    Q-T Interval 406 ms    QTc Calculation (Bazett) 526 ms    R Axis 94 degrees    T Axis -63 degrees       RADIOLOGY:  XR CHEST PORTABLE   Final Result   1. Chronic changes in the chest.  Postop changes of prior median sternotomy. Left dual lead pacemaker. 2.  No acute process detected.               ------------------------- NURSING NOTES AND VITALS REVIEWED ---------------------------  Date / Time Roomed:  3/30/2022  8:55 AM  ED Bed Assignment:  [id number]    The nursing notes within the ED encounter and vital signs as below have been reviewed. Patient Vitals for the past 24 hrs:   BP Temp Temp src Pulse Resp SpO2 Height Weight   03/30/22 2036 99/61 98 °F (36.7 °C) Oral 84 18 97 %     03/30/22 1545 97/74 97.8 °F (36.6 °C) Oral 102 18 97 %     03/30/22 1341 124/84 97.5 °F (36.4 °C) Oral 99 18 97 %     03/30/22 1300 110/70 97.5 °F (36.4 °C) Oral 101  96 %     03/30/22 1100 105/71   101  95 %     03/30/22 1053 105/71 97.9 °F (36.6 °C) Oral 101 16 95 %     03/30/22 0855 124/76 97.4 °F (36.3 °C)  108 18 97 % 5' 6\" (1.676 m) 160 lb (72.6 kg)       Oxygen Saturation Interpretation: Normal    ------------------------------------------ PROGRESS NOTES ------------------------------------------  Re-evaluation(s):  Please see ED course    Counseling:  I have spoken with the patient and her grandson and discussed todays results, in addition to providing specific details for the plan of care and counseling regarding the diagnosis and prognosis.   Their questions are answered at this time and they are agreeable with the plan of admission.    --------------------------------- ADDITIONAL PROVIDER NOTES ---------------------------------  Consultations:  Please see ED course    This patient's ED course included: a personal history and physicial examination, re-evaluation prior to disposition, multiple bedside re-evaluations, IV medications, cardiac monitoring, continuous pulse oximetry and complex medical decision making and emergency management    This patient has remained hemodynamically stable during their ED course. Diagnosis:  1. Palpitations    2. Exertional chest pain        Disposition:  Patient's disposition: Admit to telemetry  Patient's condition is stable.        Jed Reese DO  Resident  03/30/22 2100

## 2022-03-30 NOTE — H&P
CAPS, Take 100 mg by mouth nightly  b complex vitamins capsule, Take 1 capsule by mouth Daily with lunch   zinc sulfate (ZINC-220) 220 (50 Zn) MG capsule, Take 50 mg by mouth Daily with lunch   apixaban (ELIQUIS) 5 MG TABS tablet, Take 1 tablet by mouth 2 times daily  ezetimibe (ZETIA) 10 MG tablet, Take 1 tablet by mouth nightly  rosuvastatin (CRESTOR) 5 MG tablet, Take 1 tablet by mouth nightly  venlafaxine (EFFEXOR XR) 37.5 MG extended release capsule, Take 37.5 mg by mouth every evening   vitamin D (CHOLECALCIFEROL) 1000 UNIT TABS tablet, Take 1,000 Units by mouth 2 times daily   dofetilide (TIKOSYN) 125 MCG capsule, Take 125 mcg by mouth 2 times daily    Allergies:  Latex, Doxycycline, Lipitor [atorvastatin], Sulfamethoxazole, Tricor [fenofibrate], Trimethoprim, and Zocor [simvastatin]    Social History:   TOBACCO:   reports that she has never smoked. She has never used smokeless tobacco.  ETOH:   reports no history of alcohol use. Family History:       Problem Relation Age of Onset    Breast Cancer Sister        REVIEW OF SYSTEMS:  CONSTITUTIONAL:  Neg   Recent weight changes,fever,chills or night sweats. Pos: Fatigue  EYES:  Neg  blurriness,tearing,itching or acute change in vision  NOSE:  Neg  rhinorrhea,sneezing,itching,allergy or epistaxis  MOUTH/THROAT:  Neg  bleeding gums,hoarseness or sore throat. RESPIRATORY:   Neg SOB,wheeze,cough,sputum,hemoptysis or bronochitis  CARDIOVASCULAR   Neg : chest pain,dyspnea on exertion,orthopnea,paroxysmal nocturnal dyspnea or edema. Pos: palpitation  GASTROINTESTINAL:  Neg   Appetite changes,nausea,vomiting,or diarrhea,indigestion,dysphagia,change in bowel movements, or abdominal pain. GENITOURINARY:  Neg  Urinary frequency,hesitancy,urgency,polyuria,dysuria,hematuria,or incontinence.   HEMATOLOGIC/LYMPHATIC:  Neg  Anemia,bleeding tendency  MUSCULOSKELETAL:  Neg   New myalgias,bone pain,joint pain,swelling or stiffness and has had no change in gait.  NEUROLOGICAL:  Neg  Loss of Consciousness,memeory loss,forgetfulness,periods of confusion,decline in intellect,nervousness,insomina,aphasia or dysarthria. SKIN :  Neg  skin or hair changes,and has no itching,rashes,sores. PHYSICAL EXAM:  BP 97/74   Pulse 102   Temp 97.8 °F (36.6 °C) (Oral)   Resp 18   Ht 5' 6\" (1.676 m)   Wt 160 lb (72.6 kg)   LMP 09/10/1979   SpO2 97%   BMI 25.82 kg/m²   General appearance: alert, appears stated age, cooperative and no distress  Head: Normocephalic, without obvious abnormality, atraumatic. Male pattern alopecia. Eyes: conjunctivae/corneas clear. PERRL, EOM's intact. Ears: normal external ear canals both ears  Neck: no adenopathy, no carotid bruit, no JVD, supple, symmetrical, trachea midline and thyroid not enlarged, no tenderness/mass/nodules  Lungs: Clear to A and P. Pacer Left upper chest.  Heart: regular rate and rhythm, S1, S2 normal, no murmur, regular rate and rhythm ,no precordial heave  Abdomen:soft, non-tender; non-distended normal bowel sounds no masses, no organomegaly  Extremities:-CCE. Prominent veins. Skin: Skin color, texture, turgor normal. No rashes or lesions  Neurologic:Mental status: Alert, oriented, thought content appropriate  Cranial nerves:II-XII Grossly intact  Sensory: normal  Motor:grossly normal    DATA:  Recent Labs     03/30/22  0915   WBC 5.3   HGB 13.5   HCT 42.3   MCV 94.2   *     Recent Labs     03/30/22  0915      K 3.8      CO2 24   BUN 15   CREATININE 0.7   GLUCOSE 161*     Recent Labs     03/30/22  0915   AST 70*   ALT 30   BILITOT 0.5   ALKPHOS 61     No results for input(s): CKTOTAL, CKMB, TROPONINI in the last 72 hours.   Lab Results   Component Value Date    INR 1.6 03/30/2022    INR 1.3 09/14/2015    INR 1.1 07/18/2014    PROTIME 17.5 (H) 03/30/2022    PROTIME 14.6 (H) 09/14/2015    PROTIME 11.9 07/18/2014        CBC with Differential:    Lab Results   Component Value Date    WBC 5.3 03/30/2022 RBC 4.49 03/30/2022    HGB 13.5 03/30/2022    HCT 42.3 03/30/2022     03/30/2022    MCV 94.2 03/30/2022    MCH 30.1 03/30/2022    MCHC 31.9 03/30/2022    RDW 13.9 03/30/2022    LYMPHOPCT 21.3 03/30/2022    MONOPCT 6.1 03/30/2022    BASOPCT 0.8 03/30/2022    MONOSABS 0.32 03/30/2022    LYMPHSABS 1.12 03/30/2022    EOSABS 0.10 03/30/2022    BASOSABS 0.04 03/30/2022     CMP:    Lab Results   Component Value Date     03/30/2022    K 3.8 03/30/2022     03/30/2022    CO2 24 03/30/2022    BUN 15 03/30/2022    CREATININE 0.7 03/30/2022    GFRAA >60 03/30/2022    LABGLOM >60 03/30/2022    GLUCOSE 161 03/30/2022    PROT 6.7 03/30/2022    LABALBU 4.1 03/30/2022    CALCIUM 9.0 03/30/2022    BILITOT 0.5 03/30/2022    ALKPHOS 61 03/30/2022    AST 70 03/30/2022    ALT 30 03/30/2022     Magnesium:    Lab Results   Component Value Date    MG 2.0 10/18/2021     Phosphorus:  No results found for: PHOS  Troponin:    Lab Results   Component Value Date    TROPONINI <0.01 09/14/2015     U/A:    Lab Results   Component Value Date    COLORU Yellow 10/18/2021    PHUR 6.0 10/18/2021    WBCUA NONE 10/18/2021    RBCUA 0-1 10/18/2021    BACTERIA NONE SEEN 10/18/2021    CLARITYU Clear 10/18/2021    SPECGRAV <=1.005 10/18/2021    LEUKOCYTESUR Negative 10/18/2021    UROBILINOGEN 0.2 10/18/2021    BILIRUBINUR Negative 10/18/2021    BLOODU SMALL 10/18/2021    GLUCOSEU Negative 10/18/2021     ABG:  No results found for: PHART, MWX9FAM, PO2ART, APC1JXP, BEART, THGBART, FOC4IMJ, A3ESQLOC       RADIOLOGY:   XR CHEST PORTABLE   Final Result   1. Chronic changes in the chest.  Postop changes of prior median sternotomy. Left dual lead pacemaker. 2.  No acute process detected. ASSESSMENT :  1. Paroxysmal Atrial fibrillation/flutter-RVR. 2. History of CAD/DDD pacer/MV repair  3.  Hx CVA 10/21        Patient Active Problem List   Diagnosis Code    Chest discomfort R07.89    Status post mitral valve repair Z98.890    History of mitral valve prolapse in adulthood Z86.79    Sinus node dysfunction (HCC) I49.5    Presence of permanent cardiac pacemaker Z95.0    Osteoporosis M81.0    Osteoarthritis M19.90    Cervicalgia M54.2    Dyspnea on effort R06.00    Tachycardia R00.0    Hypothyroidism E03.9    Senile osteoporosis M81.0    TIA (transient ischemic attack) G45.9    Anxiety and depression F41.9, F32. A    Atrial fibrillation ([facility]) I48.91    Middle cerebral artery stenosis I66.09    Vitamin D deficiency E55.9    History of atrial fibrillation Z86.79    History of coronary artery stent placement Z95.5    Acute ischemic stroke ([facility]) I63.9    Atrial fibrillation with rapid ventricular response ([facility] [address] I48.91     PLAN:  Admit. Consult to Dr. Martina Peterson to adjust dofetelide. Maintain premorbid meds including Eliquis. Check/ follow labs.       Disposition:  Home      Electronically signed by Blanka Newton MD on 3/30/2022 at 6:47 PM

## 2022-03-30 NOTE — PROGRESS NOTES
Left message with Dr. Aleman Ellerbe office for admit orders. Dr. Kevin Boyd updated patient on unit.

## 2022-03-30 NOTE — CONSULTS
CARDIOLOGY CONSULTATION    Patient Name:  Cristela Baker    :  1940    Reason for Consultation:   History of recurrent atrial fibrillation/S/P mitral valve repair; S/P permanent dual-chamber pacemaker    History of Present Illness:   Cristela Baker presents to [facility] following following the onset of profound fatigue and generalized weakness for which she was found to be in atrial flutterfibrillation with a rapid ventricular response having come to my office yesterday for cardiac evaluation. It was recommended that she have her dosage of dofetilide adjusted for which she requires hospitalization for 72 hours. Upon arrival today to the hospital she was found to be in a ventricular paced rhythm. She is now readmitted with the hope of increasing her dosage of dofetilide as tolerated to 250 mcg every 12 hours and if this is not successful will then need to consider an alternative antiarrhythmic regimen. She has a previous history of coronary artery disease and received a coronary artery stent in approximately  and 2 years later underwent mitral valve repair in  with subsequent insertion of a permanent pacemaker with atrial pacing .her physical activities are somewhat limitedactive since that time has various complaints related to shoulder and occasional fatigability. She is unaware of her cholesterol level presently but notes it to be normal.she is now admitted for further diagnostic studies and adjustment of her medical regimen. Past Medical History:   has a past medical history of severe mitral regurgitation.;  Recurrent atrial fibrillation; sinus node dysfunction; hypercholesterolemia; degenerative osteoarthritis; osteoporosis. .    Surgical History:   has a past surgical history that includes Hysterectomy (79); Arm Debridement (); Mitral valve replacement (); Appendectomy (); pacemaker placement (); and shoulder surgery.      Social History: reports that she has never smoked. She has never used smokeless tobacco. She reports that she does not drink alcohol and does not use drugs. Family History:  family history includes Breast Cancer in her sister. Father  age 76 sudden death and mother  at age 68 secondary to pancreatic cancer. Medications:  Prior to Admission medications    Medication Sig Start Date End Date Taking? Authorizing Provider   Calcium Carb-Cholecalciferol (CALCIUM 600 + D PO) Take 1 tablet by mouth Daily with lunch   Yes Historical Provider, MD   nadolol (CORGARD) 20 MG tablet Take 10 mg by mouth daily as needed ([address])   Yes Historical Provider, MD   Coenzyme Q10 (CO Q-10) 100 MG CAPS Take 100 mg by mouth nightly   Yes Historical Provider, MD   b complex vitamins capsule Take 1 capsule by mouth Daily with lunch     Historical Provider, MD   zinc sulfate (ZINC-220) 220 (50 Zn) MG capsule Take 50 mg by mouth Daily with lunch     Historical Provider, MD   apixaban (ELIQUIS) 5 MG TABS tablet Take 1 tablet by mouth 2 times daily 10/21/21   Soumya Porras MD   ezetimibe (ZETIA) 10 MG tablet Take 1 tablet by mouth nightly 10/21/21   Soumya Porras MD   rosuvastatin (CRESTOR) 5 MG tablet Take 1 tablet by mouth nightly 10/21/21   Soumya Porras MD   venlafaxine (EFFEXOR XR) 37.5 MG extended release capsule Take 37.5 mg by mouth every evening  17   Historical Provider, MD   vitamin D (CHOLECALCIFEROL) 1000 UNIT TABS tablet Take 1,000 Units by mouth 2 times daily     Historical Provider, MD   dofetilide (TIKOSYN) 125 MCG capsule Take 125 mcg by mouth 2 times daily    Historical Provider, MD       Allergies:  Latex, Doxycycline, Lipitor [atorvastatin], Sulfamethoxazole, Tricor [fenofibrate], Trimethoprim, and Zocor [simvastatin]     Review of Systems:   · Constitutional: there has been no unanticipated weight loss. There's been no change in energy level, sleep pattern or activity level. No fever chills or rigors. · Eyes: No visual changes or diplopia. No scleral icterus. · ENT: No Headaches, hearing loss or vertigo. No mouth sores or sore throat. No change in taste or smell. · Cardiovascular: benign chest discomfort,no dyspnea on exertion,denies palpitations, loss of consciousness or phlebitis. · Respiratory: No cough or wheezing, no sputum production. No hemoptysis, pleuritic pain. · Gastrointestinal: No abdominal pain, appetite loss, blood in stools. No change in bowel habits. No hematemesis  · Genitourinary: No dysuria, trouble voiding or hematuria. No nocturia or increased frequency. · Musculoskeletal:  No gait disturbance, weakness left shoulder bursitis. Weakness and lower extremities  · Integumentary: No rash or pruritis. · Neurological: No headache, diplopia, change in muscle strength, numbness or tingling. No change in gait, balance, coordination, mood, affect, memory, mentation, behavior. · Psychiatric: Has anxiety no obvious depression. · Endocrine: No temperature intolerance. No excessive thirst, fluid intake, or urination. No tremor. · Hematologic/Lymphatic: No abnormal bruising or bleeding, blood clots or swollen lymph nodes. · Allergic/Immunologic: No nasal congestion or hives. Physical Examination:    Vital Signs: /84   Pulse 99   Temp 97.5 °F (36.4 °C) (Oral)   Resp 18   Ht 5' 6\" (1.676 m)   Wt 160 lb (72.6 kg)   LMP 09/10/1979   SpO2 97%   BMI 25.82 kg/m²   General appearance: Well preserved, mesomorphic body habitus, alert, no distress presently. Skin: Skin color, texture, turgor normal. No rashes or lesions. No induration or tightening palpated. Head: Normocephalic. No masses, lesions, tenderness or abnormalities  Eyes: Conjunctivae/corneas clear. PERRL, EOMs intact. Sclera non icteric. Ears: External ears normal. Canals clear. TM's clear bilaterally. Hearing normal to finger rub. Nose/Sinuses: Nares normal. Septum midline.  Mucosa normal. No drainage or sinus tenderness. Oropharynx: Lips, mucosa, and tongue normal. Oropharynx clear with no exudate seen. Neck: Neck supple and symmetric. No adenopathy. Thyroid symmetric, normal size, without nodules. Trachea is midline. Carotids brisk in upstroke without bruits, no abnormal JVP noted at 45°. Chest: Even excursion. Pulse generator pocket well healed without swelling or erythema. Lungs: Lungs clear to auscultation bilaterally. No retractions or use of accessory muscles. No tactile vocal fremitus. No rhonchi, crackles or rales. Heart:  S1 > S2. Regular rate and rhythm. No gallop or murmur. No rub, palpable thrill or heave noted. PMI 5th intercostal space midclavicular line. Abdomen: Abdomen soft, mildly protuberant, non-tender. BS normal. No masses, organomegaly. No hernia noted. Extremities: Extremities normal. No deformities, edema, or skin discoloration. No cyanosis or clubbing noted to the nails. Peripheral pulses present 2+ upper extremities andpresent 2+  lower extremities. Musculoskeletal: Spine ROM normal. Muscular strength intact. Neuro: Cranial nerves intact. Motor: Strength 5/5 in all extremities. Reflexes 2+ in all extremities. No focal weakness. Sensory: grossly normal to touch. Coordination intact. Pertinent Labs:  CBC:   Recent Labs     03/30/22  0915   WBC 5.3   HGB 13.5   *     BMP:  Recent Labs     03/30/22  0915      K 3.8      CO2 24   BUN 15   CREATININE 0.7   GLUCOSE 161*   LABGLOM >60     ABGs:   No results found for: PHART  INR:   Recent Labs     03/30/22  0915   INR 1.6     PRO-BNP:   No results for input(s): BNP in the last 72 hours. TSH:   Lab Results   Component Value Date    TSH 9.800 (H) 09/14/2015      Cardiac Injury Profile:   No results for input(s): CKTOTAL, CKMB, CKMBINDEX, TROPONINI in the last 72 hours.    Lipid Profile:   Lab Results   Component Value Date    TRIG 131 10/19/2021      Hemoglobin A1C:   No components found for: HGBA1C   ECG:  See report    Radiology:  7/18/2014  Examination: Chest one view portable. Indications: Chest pain. Comparison: July 11, 2014. Findings: There are bibasilar infiltrates likely related to   atelectasis or pneumonia. The rest of the lung fields are clear. The cardiac silhouette is within normal limits. There are stable   cardiac pacer leads in situ. There are no pleural effusions or   pneumothorax. IMPRESSION:   1. Minimal bibasilar infiltrates likely related to atelectasis or   pneumonia. Assessment:    Patient Active Problem List   Diagnosis    Chest discomfort    Status post mitral valve repair    History of mitral valve prolapse in adulthood    Sinus node dysfunction (HCC)    Presence of permanent cardiac pacemaker    Osteoporosis    Osteoarthritis    Cervicalgia    Dyspnea on effort    Tachycardia    Hypothyroidism    Senile osteoporosis    TIA (transient ischemic attack)    Anxiety and depression    Atrial fibrillation ([address]    Middle cerebral artery stenosis    Vitamin D deficiency    History of atrial fibrillation    History of coronary artery stent placement    Acute ischemic stroke ([address]    Atrial fibrillation with rapid ventricular response ([address]       Plan:  Mrs. Anu Romo presents with recurrent atrial fibrillation for which she is symptomatic and profoundly fatigued. She is presently pacing but less than 24 hours ago was in atrial flutter with a rapid ventricular response when she came to my office for evaluation. As she does have a history of valvular heart disease having previously undergone a mitral valve repair several years ago she has been plagued with intermittent atrial fibrillation for which she remains symptomatic as well as having a permanent pacemaker for heart block following surgery.   She also has known coronary artery disease and will adjust her medications and hopefully be able to increase her dosage of dofetilide to 250 mcg every 12 hours yet maintained her QTc interval at < 500 ms. Certainly she needs to remain on anticoagulation indefinitely. I have spent more than 55 minutes face to face with Chance Pino, with greater than 50% of this time instructing and counseling the patient  regarding my findings and recommendations and I have answered all questions as posed to me by Ms. Katt Momin her family members at her bedside. Thank you for allowing me to consult in the care of this patient. Nichole Villafuerte DO DO, FACP, [facility], [facility]    NOTE:  This report was transcribed using voice recognition software. Every effort was made to ensure accuracy; however, inadvertent computerized transcription errors may be present.

## 2022-03-30 NOTE — PROGRESS NOTES
Admission database completed to best of this RN's ability. Care plan and education initiated. Pt from home with son. Denies any DME or HHC. Primary language Alvaro, does speak some [place].

## 2022-03-31 LAB
ANION GAP SERPL CALCULATED.3IONS-SCNC: 11 MMOL/L (ref 7–16)
BUN BLDV-MCNC: 21 MG/DL (ref 6–23)
CALCIUM SERPL-MCNC: 9 MG/DL (ref 8.6–10.2)
CHLORIDE BLD-SCNC: 106 MMOL/L (ref 98–107)
CO2: 24 MMOL/L (ref 22–29)
CREAT SERPL-MCNC: 0.7 MG/DL (ref 0.5–1)
EKG ATRIAL RATE: 60 BPM
EKG P-R INTERVAL: 238 MS
EKG Q-T INTERVAL: 460 MS
EKG QRS DURATION: 94 MS
EKG QTC CALCULATION (BAZETT): 460 MS
EKG R AXIS: 34 DEGREES
EKG T AXIS: 67 DEGREES
EKG VENTRICULAR RATE: 60 BPM
GFR AFRICAN AMERICAN: >60
GFR NON-AFRICAN AMERICAN: >60 ML/MIN/1.73
GLUCOSE BLD-MCNC: 199 MG/DL (ref 74–99)
MAGNESIUM: 1.8 MG/DL (ref 1.6–2.6)
POTASSIUM REFLEX MAGNESIUM: 3.7 MMOL/L (ref 3.5–5)
SODIUM BLD-SCNC: 141 MMOL/L (ref 132–146)
TSH SERPL DL<=0.05 MIU/L-ACNC: 1.67 UIU/ML (ref 0.27–4.2)

## 2022-03-31 PROCEDURE — 83735 ASSAY OF MAGNESIUM: CPT

## 2022-03-31 PROCEDURE — 36415 COLL VENOUS BLD VENIPUNCTURE: CPT

## 2022-03-31 PROCEDURE — 93005 ELECTROCARDIOGRAM TRACING: CPT | Performed by: INTERNAL MEDICINE

## 2022-03-31 PROCEDURE — 2060000000 HC ICU INTERMEDIATE R&B

## 2022-03-31 PROCEDURE — 2580000003 HC RX 258: Performed by: INTERNAL MEDICINE

## 2022-03-31 PROCEDURE — 6370000000 HC RX 637 (ALT 250 FOR IP): Performed by: INTERNAL MEDICINE

## 2022-03-31 PROCEDURE — 80048 BASIC METABOLIC PNL TOTAL CA: CPT

## 2022-03-31 PROCEDURE — 84443 ASSAY THYROID STIM HORMONE: CPT

## 2022-03-31 RX ORDER — POTASSIUM CHLORIDE 20 MEQ/1
20 TABLET, EXTENDED RELEASE ORAL ONCE
Status: COMPLETED | OUTPATIENT
Start: 2022-03-31 | End: 2022-03-31

## 2022-03-31 RX ADMIN — Medication 1 TABLET: at 09:00

## 2022-03-31 RX ADMIN — ZINC SULFATE 220 MG (50 MG) CAPSULE 50 MG: CAPSULE at 09:00

## 2022-03-31 RX ADMIN — Medication 400 MG: at 16:01

## 2022-03-31 RX ADMIN — Medication 1000 UNITS: at 08:56

## 2022-03-31 RX ADMIN — EZETIMIBE 10 MG: 10 TABLET ORAL at 20:48

## 2022-03-31 RX ADMIN — Medication 10 ML: at 09:00

## 2022-03-31 RX ADMIN — Medication 1000 UNITS: at 20:48

## 2022-03-31 RX ADMIN — DOFETILIDE 250 MCG: 0.25 CAPSULE ORAL at 08:56

## 2022-03-31 RX ADMIN — APIXABAN 5 MG: 5 TABLET, FILM COATED ORAL at 20:48

## 2022-03-31 RX ADMIN — APIXABAN 5 MG: 5 TABLET, FILM COATED ORAL at 08:55

## 2022-03-31 RX ADMIN — DOFETILIDE 250 MCG: 0.25 CAPSULE ORAL at 20:48

## 2022-03-31 RX ADMIN — Medication 10 ML: at 20:48

## 2022-03-31 RX ADMIN — ROSUVASTATIN CALCIUM 5 MG: 5 TABLET, FILM COATED ORAL at 20:48

## 2022-03-31 RX ADMIN — ACETAMINOPHEN 650 MG: 325 TABLET ORAL at 09:14

## 2022-03-31 RX ADMIN — CALCIUM CARBONATE-VITAMIN D TAB 500 MG-200 UNIT 1 TABLET: 500-200 TAB at 08:56

## 2022-03-31 RX ADMIN — POTASSIUM CHLORIDE 20 MEQ: 20 TABLET, EXTENDED RELEASE ORAL at 16:01

## 2022-03-31 ASSESSMENT — PAIN SCALES - GENERAL
PAINLEVEL_OUTOF10: 0
PAINLEVEL_OUTOF10: 3
PAINLEVEL_OUTOF10: 3

## 2022-03-31 ASSESSMENT — PAIN DESCRIPTION - PAIN TYPE: TYPE: ACUTE PAIN

## 2022-03-31 ASSESSMENT — PAIN DESCRIPTION - LOCATION: LOCATION: HEAD

## 2022-03-31 NOTE — PROGRESS NOTES
Admit Date: 3/30/2022    Subjective:     Patient seen, discussed with nursing. Had BP 05'E systolic last night. No VS yet this am.  Labs not drawn yet this am.  Had some sweats overnight. Has HA this am but otherwise ok. Scheduled Meds:   apixaban  5 mg Oral BID    vitamin B and C  1 tablet Oral Lunch    oyster shell calcium w/D  1 tablet Oral Daily    ezetimibe  10 mg Oral Nightly    rosuvastatin  5 mg Oral Nightly    Vitamin D  1,000 Units Oral BID    zinc sulfate  50 mg Oral Lunch    sodium chloride flush  5-40 mL IntraVENous 2 times per day    dofetilide  250 mcg Oral 2 times per day     Continuous Infusions:   sodium chloride       PRN Meds:sodium chloride flush, sodium chloride, polyethylene glycol, acetaminophen **OR** acetaminophen      Objective:     No intake/output data recorded. No intake/output data recorded. BP (!) 96/59   Pulse 65   Temp 98.1 °F (36.7 °C) (Oral)   Resp 18   Ht 5' 6\" (1.676 m)   Wt 157 lb 4.8 oz (71.4 kg)   LMP 09/10/1979   SpO2 95%   BMI 25.39 kg/m²     General appearance: alert, appears stated age and cooperative  Neck: no adenopathy, no carotid bruit, no JVD, supple, symmetrical, trachea midline and thyroid not enlarged, symmetric, no tenderness/mass/nodules  Lungs: clear to auscultation bilaterally  Chest wall: no tenderness  Heart: regular rate and rhythm, S1, S2 normal, no murmur, click, rub or gallop  Abdomen: soft, non-tender; bowel sounds normal; no masses,  no organomegaly  Extremities: extremities normal, atraumatic, no cyanosis or edema. Prominent veins.     Data Review    CBC with Differential:    Lab Results   Component Value Date    WBC 5.3 03/30/2022    RBC 4.49 03/30/2022    HGB 13.5 03/30/2022    HCT 42.3 03/30/2022     03/30/2022    MCV 94.2 03/30/2022    MCH 30.1 03/30/2022    MCHC 31.9 03/30/2022    RDW 13.9 03/30/2022    LYMPHOPCT 21.3 03/30/2022    MONOPCT 6.1 03/30/2022    BASOPCT 0.8 03/30/2022    MONOSABS 0.32 03/30/2022 LYMPHSABS 1.12 03/30/2022    EOSABS 0.10 03/30/2022    BASOSABS 0.04 03/30/2022     CMP:    Lab Results   Component Value Date     03/30/2022    K 3.8 03/30/2022     03/30/2022    CO2 24 03/30/2022    BUN 15 03/30/2022    CREATININE 0.7 03/30/2022    GFRAA >60 03/30/2022    LABGLOM >60 03/30/2022    GLUCOSE 161 03/30/2022    PROT 6.7 03/30/2022    LABALBU 4.1 03/30/2022    CALCIUM 9.0 03/30/2022    BILITOT 0.5 03/30/2022    ALKPHOS 61 03/30/2022    AST 70 03/30/2022    ALT 30 03/30/2022       XR CHEST PORTABLE   Final Result   1. Chronic changes in the chest.  Postop changes of prior median sternotomy. Left dual lead pacemaker. 2.  No acute process detected. Assessment:     1. Paroxysmal Atrial fibrillation/flutter-RVR. 2. History of CAD/DDD pacer/MV repair  3. Hx CVA 10/21        Plan:     Await am labs. Ambulate. Plan for 72 hr stay with tikosyn adjustment.       Electronically signed by Jody Glynn MD on 3/31/2022 at 7:46 AM

## 2022-03-31 NOTE — CARE COORDINATION
Lives w/ her  in a 2 story house- 4 steps to entrance. Independent PTA. No Hx DMEs, HHC, or GREG. PCP is Dr. Yisel De Oliveira and pharmacy is [facility]. On Eliquis and Tikosyn PTA- pt is being monitored while Tikosyn dose is being increased.  Plan is to return home on discharge- family will provide transportation- no needs Fay Baig RN case manager

## 2022-03-31 NOTE — PLAN OF CARE
Problem:  Activity:  Goal: Ability to tolerate increased activity will improve  Description: Ability to tolerate increased activity will improve  3/30/2022 2156 by Concepción Martinez RN  Outcome: Ongoing  3/30/2022 1817 by Tono Hopkins RN  Outcome: Met This Shift  Goal: Expression of feelings of increased energy will increase  Description: Expression of feelings of increased energy will increase  Outcome: Ongoing     Problem: Cardiac:  Goal: Complications related to the disease process, condition or treatment will be avoided or minimized  Description: Complications related to the disease process, condition or treatment will be avoided or minimized  Outcome: Ongoing

## 2022-03-31 NOTE — CARE COORDINATION
Met w/ patient and family. Explained role of  and plan of care. Lives w/ her  in a 2 story house- 4 steps to entrance. Independent PTA. No Hx DMEs, HHC, or GREG. PCP is Dr. Conchita Rosas and pharmacy is [facility]. On Eliquis and Tikosyn PTA- pt is being monitored while Tikosyn dose is being increased. Plan is to return home on discharge- family will provide transportation- no needs.  Will follow Jostin Ray RN case manager

## 2022-04-01 VITALS
SYSTOLIC BLOOD PRESSURE: 102 MMHG | TEMPERATURE: 98.2 F | RESPIRATION RATE: 18 BRPM | HEIGHT: 66 IN | BODY MASS INDEX: 25.28 KG/M2 | WEIGHT: 157.3 LBS | DIASTOLIC BLOOD PRESSURE: 59 MMHG | OXYGEN SATURATION: 93 % | HEART RATE: 64 BPM

## 2022-04-01 LAB
ANION GAP SERPL CALCULATED.3IONS-SCNC: 13 MMOL/L (ref 7–16)
BASOPHILS ABSOLUTE: 0.03 E9/L (ref 0–0.2)
BASOPHILS RELATIVE PERCENT: 0.5 % (ref 0–2)
BUN BLDV-MCNC: 14 MG/DL (ref 6–23)
CALCIUM SERPL-MCNC: 8.9 MG/DL (ref 8.6–10.2)
CHLORIDE BLD-SCNC: 105 MMOL/L (ref 98–107)
CO2: 22 MMOL/L (ref 22–29)
CREAT SERPL-MCNC: 0.6 MG/DL (ref 0.5–1)
EKG ATRIAL RATE: 101 BPM
EKG Q-T INTERVAL: 406 MS
EKG QRS DURATION: 152 MS
EKG QTC CALCULATION (BAZETT): 526 MS
EKG R AXIS: 94 DEGREES
EKG T AXIS: -63 DEGREES
EKG VENTRICULAR RATE: 101 BPM
EOSINOPHILS ABSOLUTE: 0.11 E9/L (ref 0.05–0.5)
EOSINOPHILS RELATIVE PERCENT: 1.9 % (ref 0–6)
GFR AFRICAN AMERICAN: >60
GFR NON-AFRICAN AMERICAN: >60 ML/MIN/1.73
GLUCOSE BLD-MCNC: 166 MG/DL (ref 74–99)
HBA1C MFR BLD: 5.7 % (ref 4–5.6)
HCT VFR BLD CALC: 40.3 % (ref 34–48)
HEMOGLOBIN: 12.7 G/DL (ref 11.5–15.5)
IMMATURE GRANULOCYTES #: 0.01 E9/L
IMMATURE GRANULOCYTES %: 0.2 % (ref 0–5)
LYMPHOCYTES ABSOLUTE: 1.42 E9/L (ref 1.5–4)
LYMPHOCYTES RELATIVE PERCENT: 24.1 % (ref 20–42)
MAGNESIUM: 1.8 MG/DL (ref 1.6–2.6)
MCH RBC QN AUTO: 30.4 PG (ref 26–35)
MCHC RBC AUTO-ENTMCNC: 31.5 % (ref 32–34.5)
MCV RBC AUTO: 96.4 FL (ref 80–99.9)
MONOCYTES ABSOLUTE: 0.33 E9/L (ref 0.1–0.95)
MONOCYTES RELATIVE PERCENT: 5.6 % (ref 2–12)
NEUTROPHILS ABSOLUTE: 4 E9/L (ref 1.8–7.3)
NEUTROPHILS RELATIVE PERCENT: 67.7 % (ref 43–80)
PDW BLD-RTO: 14.1 FL (ref 11.5–15)
PLATELET # BLD: 109 E9/L (ref 130–450)
PMV BLD AUTO: 11.3 FL (ref 7–12)
POTASSIUM SERPL-SCNC: 4 MMOL/L (ref 3.5–5)
RBC # BLD: 4.18 E12/L (ref 3.5–5.5)
SODIUM BLD-SCNC: 140 MMOL/L (ref 132–146)
WBC # BLD: 5.9 E9/L (ref 4.5–11.5)

## 2022-04-01 PROCEDURE — 85025 COMPLETE CBC W/AUTO DIFF WBC: CPT

## 2022-04-01 PROCEDURE — 36415 COLL VENOUS BLD VENIPUNCTURE: CPT

## 2022-04-01 PROCEDURE — 83735 ASSAY OF MAGNESIUM: CPT

## 2022-04-01 PROCEDURE — 2580000003 HC RX 258: Performed by: INTERNAL MEDICINE

## 2022-04-01 PROCEDURE — 80048 BASIC METABOLIC PNL TOTAL CA: CPT

## 2022-04-01 PROCEDURE — 6370000000 HC RX 637 (ALT 250 FOR IP): Performed by: INTERNAL MEDICINE

## 2022-04-01 PROCEDURE — 83036 HEMOGLOBIN GLYCOSYLATED A1C: CPT

## 2022-04-01 RX ORDER — DOFETILIDE 0.25 MG/1
250 CAPSULE ORAL EVERY 12 HOURS SCHEDULED
Qty: 60 CAPSULE | Refills: 3 | Status: SHIPPED | OUTPATIENT
Start: 2022-04-01

## 2022-04-01 RX ADMIN — Medication 400 MG: at 08:59

## 2022-04-01 RX ADMIN — Medication 1 TABLET: at 14:46

## 2022-04-01 RX ADMIN — Medication 1000 UNITS: at 08:59

## 2022-04-01 RX ADMIN — Medication 10 ML: at 08:59

## 2022-04-01 RX ADMIN — CALCIUM CARBONATE-VITAMIN D TAB 500 MG-200 UNIT 1 TABLET: 500-200 TAB at 08:59

## 2022-04-01 RX ADMIN — APIXABAN 5 MG: 5 TABLET, FILM COATED ORAL at 08:59

## 2022-04-01 RX ADMIN — DOFETILIDE 250 MCG: 0.25 CAPSULE ORAL at 08:58

## 2022-04-01 RX ADMIN — ZINC SULFATE 220 MG (50 MG) CAPSULE 50 MG: CAPSULE at 14:46

## 2022-04-01 ASSESSMENT — PAIN DESCRIPTION - ORIENTATION: ORIENTATION: LEFT

## 2022-04-01 ASSESSMENT — PAIN SCALES - GENERAL: PAINLEVEL_OUTOF10: 4

## 2022-04-01 ASSESSMENT — PAIN DESCRIPTION - FREQUENCY: FREQUENCY: INTERMITTENT

## 2022-04-01 ASSESSMENT — PAIN DESCRIPTION - PAIN TYPE: TYPE: CHRONIC PAIN

## 2022-04-01 ASSESSMENT — PAIN DESCRIPTION - LOCATION: LOCATION: SHOULDER

## 2022-04-01 ASSESSMENT — PAIN DESCRIPTION - DESCRIPTORS: DESCRIPTORS: DISCOMFORT

## 2022-04-01 NOTE — PROGRESS NOTES
PROGRESS NOTE       PATIENT PROBLEM LIST:  Principal Problem:    Atrial fibrillation ([address]  Active Problems:    Atrial fibrillation with rapid ventricular response (HCC)  Resolved Problems:    * No resolved hospital problems. *      SUBJECTIVE:  Patricia Barbosa is sitting up eating her lunch but voices complaints unrelated to her cardiac rhythm or pacemaker but rather her recurrent persistent complaint of tapping in her head bilaterally as well as discomfort in her left shoulder and arm and feels like her arm is excessively warm. Likewise she did not sleep well because she was disturbed while attempting to sleep and could not fall back asleep. REVIEW OF SYSTEMS:  General ROS: negative for - fatigue, malaise,  weight gain or weight loss  Psychological ROS: positive for - anxiety , depression  Ophthalmic ROS: negative for - decreased vision or visual distortion. ENT ROS: negative  Allergy and Immunology ROS: negative  Hematological and Lymphatic ROS: negative  Endocrine: no heat or cold intolerance and no polyphagia, polydipsia, or polyuria  Respiratory ROS: negative for - hemoptysis, pleuritic pain and shortness of breath  Cardiovascular ROS: no irregular heartbeat, chest discomfort  Gastrointestinal ROS: no abdominal pain, change in bowel habits, or black or bloody stools  Genito-Urinary ROS: no nocturia, dysuria, trouble voiding, frequency or hematuria  Musculoskeletal ROS: negative for- myalgias, arthralgias, or claudication Neurological ROS: no TIA or stroke symptoms otherwise no significant change in symptoms or problems since yesterday as documented in previous progress notes.     SCHEDULED MEDICATIONS:      VITAL SIGNS:                                                                                                                          BP (!) 102/59   Pulse 64   Temp 98.2 °F (36.8 °C) (Oral)   Resp 18   Ht 5' 6\" (1.676 m)   Wt 157 lb 4.8 oz (71.4 kg)   LMP 09/10/1979   SpO2 93%   BMI 25.39 kg/m²   Patient Vitals for the past 96 hrs (Last 3 readings):   Weight   03/31/22 0553 157 lb 4.8 oz (71.4 kg)   03/30/22 0855 160 lb (72.6 kg)     OBJECTIVE:    HEENT: PERRL, EOM  Intact; sclera non-icteric, conjunctiva pink. Carotids are brisk in upstroke with normal contour. No carotid bruits. Normal jugular venous pulsation at 45°. No palpable cervical nor supraclavicular nodes. Thyroid not palpable. Trachea midline. Chest: Even excursion; pulse generator nonindurated nor erythematous. Lungs: Grossly clear to auscultation bilaterally, no expiratory wheezes or rhonchi, no decreased tactile fremitus without inspiratory rales. Heart: Regular  rhythm; S1 > S2, no gallop grade 2/6 systolic murmur left mid lower sternal border. No clicks, rub, palpable thrills   or heaves. PMI nondisplaced, 5th intercostal space MCL. Abdomen: Soft, nontender, nondistended,  moderately protuberant, no masses or organomegaly. Bowel sounds active. Extremities: Without clubbing, cyanosis or edema. Pulses present 3+ upper extermities bilaterally; present 1+ DP and present 1+ PT bilaterally. No significant increase in palpable temperature and feeling both skin of her right and left arms. Data:   Scheduled Meds: Reviewed  Continuous Infusions:       Intake/Output Summary (Last 24 hours) at 4/1/2022 1212  Last data filed at 3/31/2022 1230  Gross per 24 hour   Intake 240 ml   Output    Net 240 ml     CBC:   Recent Labs     03/30/22  0915 04/01/22  0905   WBC 5.3 5.9   HGB 13.5 12.7   HCT 42.3 40.3   * 109*     BMP:  Recent Labs     03/30/22  0915 03/30/22  0915 03/31/22  0847 03/31/22  0847 04/01/22  0905     --  141  --  140   K 3.8  --  3.7  --  4.0     --  106  --  105   CO2 24  --  24  --  22   BUN 15  --  21  --  14   CREATININE 0.7  --  0.7  --  0.6   LABGLOM >60   < > >60   < > >60    < > = values in this interval not displayed.      ABGs: No results found for: PH, PO2, PCO2  INR:   Recent Labs 03/30/22  0915   INR 1.6     PRO-BNP:   Lab Results   Component Value Date    PROBNP 1,535 (H) 03/30/2022    PROBNP 345 09/14/2015      TSH:   Lab Results   Component Value Date    TSH 1.670 03/31/2022      Cardiac Injury Profile:   Lab Results   Component Value Date    CKTOTAL 86 09/14/2015    TROPHS 12 (H) 03/30/2022      Lipid Profile:   Lab Results   Component Value Date    TRIG 131 10/19/2021    HDL 54 10/19/2021    LDLCALC 148 10/19/2021    CHOL 228 10/19/2021      Hemoglobin A1C: No components found for: HGBA1C      RAD:   CTA NECK W CONTRAST    Result Date: 10/19/2021  EXAMINATION: CTA OF THE NECK; CTA OF THE HEAD WITH CONTRAST 10/18/2021 11:43 pm: TECHNIQUE: CTA of the neck was performed with the administration of intravenous contrast. Multiplanar reformatted images are provided for review. MIP images are provided for review. Stenosis of the internal carotid arteries measured using NASCET criteria. Dose modulation, iterative reconstruction, and/or weight based adjustment of the mA/kV was utilized to reduce the radiation dose to as low as reasonably achievable.; CTA of the head/brain was performed with the administration of intravenous contrast. Multiplanar reformatted images are provided for review. MIP images are provided for review. Dose modulation, iterative reconstruction, and/or weight based adjustment of the mA/kV was utilized to reduce the radiation dose to as low as reasonably achievable. Noncontrast CT of the head with reconstructed 2-D images are also provided for review. COMPARISON: None. HISTORY: ORDERING SYSTEM PROVIDED HISTORY: brief change in mental status 230pm TECHNOLOGIST PROVIDED HISTORY: Reason for exam:->brief change in mental status 230pm Has a \"code stroke\" or \"stroke alert\" been called? ->No Decision Support Exception - unselect if not a suspected or confirmed emergency medical condition->Emergency Medical Condition ([place]) FINDINGS: CT HEAD: BRAIN/VENTRICLES:  No acute intracranial hemorrhage or extraaxial fluid collection. Grey-white differentiation is maintained. No evidence of mass, mass effect or midline shift. No evidence of hydrocephalus. Symmetric generalized cerebral and cerebellar volume loss. Atherosclerosis. ORBITS: The visualized portion of the orbits demonstrate no acute abnormality. SINUSES:  The visualized paranasal sinuses and mastoid air cells demonstrate no acute abnormality. SOFT TISSUES/SKULL: No acute abnormality of the visualized skull or soft tissues. CTA NECK: AORTIC ARCH/ARCH VESSELS: No dissection or arterial injury. Atherosclerosis in the visualized thoracic aorta. Atherosclerosis in the right brachiocephalic and bilateral subclavian arteries without hemodynamically significant stenosis. CAROTID ARTERIES: No dissection, arterial injury, or hemodynamically significant stenosis by NASCET criteria. Atherosclerosis in the bilateral common carotid arteries and carotid bulbs. VERTEBRAL ARTERIES: No dissection, arterial injury, or significant stenosis. SOFT TISSUES: The lung apices are clear. No cervical or superior mediastinal lymphadenopathy. The larynx and pharynx are unremarkable. No acute abnormality of the salivary and thyroid glands. Coronary artery calcifications. BONES: No acute osseous abnormality. Partially visualized median sternotomy. CTA HEAD: ANTERIOR CIRCULATION: Atherosclerosis in the bilateral intracranial internal carotid arteries without hemodynamically significant stenosis. High-grade stenosis of the bilateral middle cerebral artery M1 segments with a focal calcified atherosclerotic plaque in the left M1 segment. The bilateral middle cerebral artery branches are patent. The proximal bilateral anterior cerebral arteries are patent without focal high-grade stenosis or occlusion. No anterior circulation aneurysm POSTERIOR CIRCULATION: No significant stenosis of the vertebral, basilar, or posterior cerebral arteries. No aneurysm.  OTHER: No dural venous sinus thrombosis on this non-dedicated study. 1. No acute intracranial hemorrhage or mass effect. 2. High-grade stenosis of the bilateral middle cerebral artery M1 segments may be chronic. Focal calcified atherosclerotic plaque in the left middle cerebral artery M1 segment. Otherwise, the bilateral middle cerebral artery branches are patent. 3. No hemodynamically significant stenosis in the bilateral cervical internal carotid arteries per NASCET criteria. Bilateral carotid bulb atherosclerotic plaque. 4. Patent bilateral vertebral arteries. Follow-up brain MRI and/or conventional catheter angiogram may be helpful for further evaluation. CTA HEAD W CONTRAST    Result Date: 10/19/2021  EXAMINATION: CTA OF THE NECK; CTA OF THE HEAD WITH CONTRAST 10/18/2021 11:43 pm: TECHNIQUE: CTA of the neck was performed with the administration of intravenous contrast. Multiplanar reformatted images are provided for review. MIP images are provided for review. Stenosis of the internal carotid arteries measured using NASCET criteria. Dose modulation, iterative reconstruction, and/or weight based adjustment of the mA/kV was utilized to reduce the radiation dose to as low as reasonably achievable.; CTA of the head/brain was performed with the administration of intravenous contrast. Multiplanar reformatted images are provided for review. MIP images are provided for review. Dose modulation, iterative reconstruction, and/or weight based adjustment of the mA/kV was utilized to reduce the radiation dose to as low as reasonably achievable. Noncontrast CT of the head with reconstructed 2-D images are also provided for review. COMPARISON: None. HISTORY: ORDERING SYSTEM PROVIDED HISTORY: brief change in mental status 230pm TECHNOLOGIST PROVIDED HISTORY: Reason for exam:->brief change in mental status 230pm Has a \"code stroke\" or \"stroke alert\" been called? ->No Decision Support Exception - unselect if not a suspected or confirmed emergency medical condition->Emergency Medical Condition (MA) FINDINGS: CT HEAD: BRAIN/VENTRICLES:  No acute intracranial hemorrhage or extraaxial fluid collection. Grey-white differentiation is maintained. No evidence of mass, mass effect or midline shift. No evidence of hydrocephalus. Symmetric generalized cerebral and cerebellar volume loss. Atherosclerosis. ORBITS: The visualized portion of the orbits demonstrate no acute abnormality. SINUSES:  The visualized paranasal sinuses and mastoid air cells demonstrate no acute abnormality. SOFT TISSUES/SKULL: No acute abnormality of the visualized skull or soft tissues. CTA NECK: AORTIC ARCH/ARCH VESSELS: No dissection or arterial injury. Atherosclerosis in the visualized thoracic aorta. Atherosclerosis in the right brachiocephalic and bilateral subclavian arteries without hemodynamically significant stenosis. CAROTID ARTERIES: No dissection, arterial injury, or hemodynamically significant stenosis by NASCET criteria. Atherosclerosis in the bilateral common carotid arteries and carotid bulbs. VERTEBRAL ARTERIES: No dissection, arterial injury, or significant stenosis. SOFT TISSUES: The lung apices are clear. No cervical or superior mediastinal lymphadenopathy. The larynx and pharynx are unremarkable. No acute abnormality of the salivary and thyroid glands. Coronary artery calcifications. BONES: No acute osseous abnormality. Partially visualized median sternotomy. CTA HEAD: ANTERIOR CIRCULATION: Atherosclerosis in the bilateral intracranial internal carotid arteries without hemodynamically significant stenosis. High-grade stenosis of the bilateral middle cerebral artery M1 segments with a focal calcified atherosclerotic plaque in the left M1 segment. The bilateral middle cerebral artery branches are patent. The proximal bilateral anterior cerebral arteries are patent without focal high-grade stenosis or occlusion.  No anterior circulation aneurysm POSTERIOR CIRCULATION: No significant stenosis of the vertebral, basilar, or posterior cerebral arteries. No aneurysm. OTHER: No dural venous sinus thrombosis on this non-dedicated study. 1. No acute intracranial hemorrhage or mass effect. 2. High-grade stenosis of the bilateral middle cerebral artery M1 segments may be chronic. Focal calcified atherosclerotic plaque in the left middle cerebral artery M1 segment. Otherwise, the bilateral middle cerebral artery branches are patent. 3. No hemodynamically significant stenosis in the bilateral cervical internal carotid arteries per NASCET criteria. Bilateral carotid bulb atherosclerotic plaque. 4. Patent bilateral vertebral arteries. Follow-up brain MRI and/or conventional catheter angiogram may be helpful for further evaluation. EKG: See Report  Echo: See Report      IMPRESSIONS:  Principal Problem:    Atrial fibrillation ([address]  Active Problems:    Atrial fibrillation with rapid ventricular response (HCC)  Resolved Problems:    * No resolved hospital problems. *      RECOMMENDATIONS:  Mrs. Ghazala Barr will be discharged this afternoon having a QTc interval <500 ms and asymptomatic from a cardiac standpoint. Unfortunately she has recurrent persistent complaints of a feeling of tapping in her head on both sides and did not sleep well last evening stating that she was awakened by a nurse to take blood in vitals. She also complains of left shoulder and arm discomfort which she said she has had the same for several years. I have discussed her present cardiac status with her primary internist Dr. Rachelle Bell and she will be discharged and followed up as an outpatient through his office and I will see her as well in 3 months to reevaluate her cardiac status relative to recurrent atrial fibrillation and known underlying valvular heart disease status post mitral valve repair. Likewise would monitor platelet count as outpatient.   I have spent more than 28 minutes face to face with Macarena Savage and reviewing notes and laboratory data, with greater than 50% of this time instructing and counseling the patient with family members present face to face regarding my findings and recommendations and I have answered all questions as posed to me by Ms. Shi Otto  and her family members present. Ilene Solorzano, DO FACP,FACC,FSCAI      NOTE:  This report was transcribed using voice recognition software.   Every effort was made to ensure accuracy; however, inadvertent computerized transcription errors may be present

## 2022-04-01 NOTE — PROGRESS NOTES
Admit Date: 3/30/2022    Subjective:     Patient seen. Dr. Jessica Kinsey note reviewed. She had difficulty sleeping last night due to L sided HA and LUE discomfort. She tends to having chronic intermittent pains/headaches. She is fine this am.  Per Dr. Jessica Kinsey note, she may be able to go home later today. Scheduled Meds:   magnesium oxide  400 mg Oral Daily    apixaban  5 mg Oral BID    vitamin B and C  1 tablet Oral Lunch    oyster shell calcium w/D  1 tablet Oral Daily    ezetimibe  10 mg Oral Nightly    rosuvastatin  5 mg Oral Nightly    Vitamin D  1,000 Units Oral BID    zinc sulfate  50 mg Oral Lunch    sodium chloride flush  5-40 mL IntraVENous 2 times per day    dofetilide  250 mcg Oral 2 times per day     Continuous Infusions:   sodium chloride       PRN Meds:sodium chloride flush, sodium chloride, polyethylene glycol, acetaminophen **OR** acetaminophen      Objective:     I/O last 3 completed shifts: In: 540 [P.O.:540]  Out: -   No intake/output data recorded. /60   Pulse 66   Temp 97.9 °F (36.6 °C) (Oral)   Resp 18   Ht 5' 6\" (1.676 m)   Wt 157 lb 4.8 oz (71.4 kg)   LMP 09/10/1979   SpO2 94%   BMI 25.39 kg/m²     General appearance: alert, appears stated age and cooperative  Neck: no adenopathy, no carotid bruit, no JVD, supple, symmetrical, trachea midline and thyroid not enlarged, symmetric, no tenderness/mass/nodules  Lungs: clear to auscultation bilaterally  Chest wall: no tenderness  Heart: regular rate and rhythm, S1, S2 normal, no murmur, click, rub or gallop  Abdomen: soft, non-tender; bowel sounds normal; no masses,  no organomegaly  Extremities: extremities normal, atraumatic, no cyanosis or edema.  Prominent veins.   Data Review    TODAY'S LABS PENDING    CBC with Differential:    Lab Results   Component Value Date    WBC 5.3 03/30/2022    RBC 4.49 03/30/2022    HGB 13.5 03/30/2022    HCT 42.3 03/30/2022     03/30/2022    MCV 94.2 03/30/2022    MCH 30.1 03/30/2022    MCHC 31.9 03/30/2022    RDW 13.9 03/30/2022    LYMPHOPCT 21.3 03/30/2022    MONOPCT 6.1 03/30/2022    BASOPCT 0.8 03/30/2022    MONOSABS 0.32 03/30/2022    LYMPHSABS 1.12 03/30/2022    EOSABS 0.10 03/30/2022    BASOSABS 0.04 03/30/2022     BMP:    Lab Results   Component Value Date     03/31/2022    K 3.7 03/31/2022     03/31/2022    CO2 24 03/31/2022    BUN 21 03/31/2022    LABALBU 4.1 03/30/2022    CREATININE 0.7 03/31/2022    CALCIUM 9.0 03/31/2022    GFRAA >60 03/31/2022    LABGLOM >60 03/31/2022    GLUCOSE 199 03/31/2022       XR CHEST PORTABLE   Final Result   1. Chronic changes in the chest.  Postop changes of prior median sternotomy. Left dual lead pacemaker. 2.  No acute process detected. Assessment:     1. Paroxysmal Atrial fibrillation/flutter-RVR. 2. History of CAD/DDD pacer/MV repair  3. Hx CVA 10/21     Plan:     Check am lab . Elevated FBS last few days noted. No hx DM. Will check A1c today. Ambulate. Should be ok for d/c later today if ok with cardiology.       Electronically signed by Lisa Interiano MD on 4/1/2022 at 7:58 AM

## 2022-04-01 NOTE — PROGRESS NOTES
Discharge instructions reviewed with pt and son-in-law at bedside; verbalized understanding. Answered all questions presented at this time. WC provided to main lobby.

## 2022-04-01 NOTE — PROGRESS NOTES
CLINICAL PHARMACY NOTE: MEDS TO BEDS    Total # of Prescriptions Filled: 1   The following medications were delivered to the patient:  · Dofetilide 250mg    Additional Documentation:

## 2022-04-02 LAB
EKG ATRIAL RATE: 61 BPM
EKG ATRIAL RATE: 62 BPM
EKG ATRIAL RATE: 66 BPM
EKG P AXIS: -9 DEGREES
EKG P AXIS: 80 DEGREES
EKG P-R INTERVAL: 226 MS
EKG P-R INTERVAL: 226 MS
EKG P-R INTERVAL: 250 MS
EKG Q-T INTERVAL: 430 MS
EKG Q-T INTERVAL: 432 MS
EKG Q-T INTERVAL: 444 MS
EKG QRS DURATION: 88 MS
EKG QRS DURATION: 92 MS
EKG QRS DURATION: 94 MS
EKG QTC CALCULATION (BAZETT): 434 MS
EKG QTC CALCULATION (BAZETT): 450 MS
EKG QTC CALCULATION (BAZETT): 454 MS
EKG R AXIS: 10 DEGREES
EKG R AXIS: 12 DEGREES
EKG R AXIS: 14 DEGREES
EKG T AXIS: 55 DEGREES
EKG T AXIS: 59 DEGREES
EKG T AXIS: 72 DEGREES
EKG VENTRICULAR RATE: 61 BPM
EKG VENTRICULAR RATE: 63 BPM
EKG VENTRICULAR RATE: 66 BPM

## 2022-04-07 NOTE — DISCHARGE SUMMARY
lb 4.8 oz (71.4 kg)   LMP 09/10/1979   SpO2 93%   BMI 25.39 kg/m²   General appearance: alert, appears stated age and cooperative  Head: Normocephalic, without obvious abnormality, atraumatic  Eyes: conjunctivae/corneas clear. PERRL, EOM's intact. Ears: External ears -normal  Nose: No drainage or sinus tenderness. Throat: lips, mucosa, and tongue normal; teeth and gums normal  Neck: no adenopathy, no carotid bruit, no JVD, supple, symmetrical, trachea midline and thyroid not enlarged, symmetric, no tenderness/mass/nodules  Lungs: clear to auscultation bilaterally  Heart: regular rate and rhythm, S1, S2 normal, no murmur,   Abdomen: soft, non-tender; bowel sounds normal; no masses,  no organomegaly  Extremities: extremities normal, atraumatic, no cyanosis or edema  Neurologic: Grossly normal    XR CHEST PORTABLE   Final Result   1. Chronic changes in the chest.  Postop changes of prior median sternotomy. Left dual lead pacemaker. 2.  No acute process detected.              Consults: cardiology  Treatments: Adjustment in dofetelide with appropriate monitoring  Disposition: home  Discharged Condition: Stable  Follow Up: Primary Care Physician in one week  Discharge Medications:     Medication List      START taking these medications    magnesium oxide 400 (241.3 Mg) MG Tabs tablet  Commonly known as: MAG-OX  Take 1 tablet by mouth daily        CHANGE how you take these medications    dofetilide 250 MCG capsule  Commonly known as: TIKOSYN  Take 1 capsule by mouth every 12 hours  What changed:   · medication strength  · how much to take  · when to take this        CONTINUE taking these medications    apixaban 5 MG Tabs tablet  Commonly known as: ELIQUIS  Take 1 tablet by mouth 2 times daily     b complex vitamins capsule     CALCIUM 600 + D PO     Co Q-10 100 MG Caps     ezetimibe 10 MG tablet  Commonly known as: ZETIA  Take 1 tablet by mouth nightly     nadolol 20 MG tablet  Commonly known as: CORGARD rosuvastatin 5 MG tablet  Commonly known as: CRESTOR  Take 1 tablet by mouth nightly     venlafaxine 37.5 MG extended release capsule  Commonly known as: EFFEXOR XR     vitamin D 1000 UNIT Tabs tablet  Commonly known as: CHOLECALCIFEROL     Zinc-220 220 (50 Zn) MG capsule  Generic drug: zinc sulfate           Where to Get Your Medications      These medications were sent to [facility], [address] [place] [address], [address] [place] [address]    Phone: [phone]   · dofetilide 250 MCG capsule  · magnesium oxide 400 (241.3 Mg) MG Tabs tablet        Activity: activity as tolerated  Diet: cardiac dietWound Care: none needed  Time Spent on discharge is more than 30 minutes discussing plan of care and discharge medications. Atrial fibrillation RVR    CAD/ Stent   MV repair [facility] 2008  DDD pacer 2008  Hypercholesterol-intolerant for multiple meds  Paroxysmal AF  CVA 10/21  Depression/anxiety/insomnia  Osteoporosis/Vit D def.     Signed:  Electronically signed by Vannesa Quintana MD on 4/7/2022 at 6:29 AM

## 2024-02-27 RX ORDER — SODIUM CHLORIDE 9 MG/ML
INJECTION, SOLUTION INTRAVENOUS CONTINUOUS
OUTPATIENT
Start: 2024-03-01

## 2024-02-27 RX ORDER — SODIUM CHLORIDE 0.9 % (FLUSH) 0.9 %
5-40 SYRINGE (ML) INJECTION PRN
OUTPATIENT
Start: 2024-03-01

## 2024-02-27 RX ORDER — SODIUM CHLORIDE 9 MG/ML
5-250 INJECTION, SOLUTION INTRAVENOUS PRN
OUTPATIENT
Start: 2024-03-01

## 2024-02-27 RX ORDER — DIPHENHYDRAMINE HYDROCHLORIDE 50 MG/ML
50 INJECTION INTRAMUSCULAR; INTRAVENOUS
OUTPATIENT
Start: 2024-03-01

## 2024-02-27 RX ORDER — ACETAMINOPHEN 325 MG/1
650 TABLET ORAL
OUTPATIENT
Start: 2024-03-01

## 2024-02-27 RX ORDER — ALBUTEROL SULFATE 90 UG/1
4 AEROSOL, METERED RESPIRATORY (INHALATION) PRN
OUTPATIENT
Start: 2024-03-01

## 2024-02-27 RX ORDER — ZOLEDRONIC ACID 5 MG/100ML
5 INJECTION, SOLUTION INTRAVENOUS ONCE
OUTPATIENT
Start: 2024-03-01 | End: 2024-03-01

## 2024-02-27 RX ORDER — EPINEPHRINE 1 MG/ML
0.3 INJECTION, SOLUTION, CONCENTRATE INTRAVENOUS PRN
OUTPATIENT
Start: 2024-03-01

## 2024-03-18 NOTE — PROGRESS NOTES
Received a referral for Reclast along with labs and height and weight on order. Called to hospital Pharmacist Alexa to have creatinine clearance calculated. Patient is 84 years old, height 5'3\", weight 160 lb , gender female, creatinine level 0.74 mg/dL. Pharmacist did calculation and I was told creatinine clearance is 62 ml/min.

## 2024-03-19 ENCOUNTER — HOSPITAL ENCOUNTER (OUTPATIENT)
Dept: INFUSION THERAPY | Age: 84
Setting detail: INFUSION SERIES
Discharge: HOME | End: 2024-03-19
Payer: MEDICARE

## 2024-03-19 VITALS
HEART RATE: 62 BPM | WEIGHT: 160 LBS | BODY MASS INDEX: 25.71 KG/M2 | OXYGEN SATURATION: 93 % | DIASTOLIC BLOOD PRESSURE: 59 MMHG | TEMPERATURE: 97.4 F | RESPIRATION RATE: 16 BRPM | HEIGHT: 66 IN | SYSTOLIC BLOOD PRESSURE: 112 MMHG

## 2024-03-19 DIAGNOSIS — M81.0 AGE-RELATED OSTEOPOROSIS WITHOUT CURRENT PATHOLOGICAL FRACTURE: Primary | ICD-10-CM

## 2024-03-19 PROCEDURE — 6360000002 HC RX W HCPCS: Performed by: INTERNAL MEDICINE

## 2024-03-19 PROCEDURE — 2580000003 HC RX 258: Performed by: INTERNAL MEDICINE

## 2024-03-19 PROCEDURE — 96365 THER/PROPH/DIAG IV INF INIT: CPT

## 2024-03-19 RX ORDER — SODIUM CHLORIDE 9 MG/ML
INJECTION, SOLUTION INTRAVENOUS CONTINUOUS
OUTPATIENT
Start: 2025-03-18

## 2024-03-19 RX ORDER — SODIUM CHLORIDE 0.9 % (FLUSH) 0.9 %
5-40 SYRINGE (ML) INJECTION PRN
OUTPATIENT
Start: 2025-03-18

## 2024-03-19 RX ORDER — SODIUM CHLORIDE 9 MG/ML
5-250 INJECTION, SOLUTION INTRAVENOUS PRN
Status: DISCONTINUED | OUTPATIENT
Start: 2024-03-19 | End: 2024-03-20 | Stop reason: HOSPADM

## 2024-03-19 RX ORDER — SODIUM CHLORIDE 9 MG/ML
5-250 INJECTION, SOLUTION INTRAVENOUS PRN
OUTPATIENT
Start: 2025-03-18

## 2024-03-19 RX ORDER — ZOLEDRONIC ACID 5 MG/100ML
5 INJECTION, SOLUTION INTRAVENOUS ONCE
Status: CANCELLED | OUTPATIENT
Start: 2025-03-18 | End: 2025-03-18

## 2024-03-19 RX ORDER — ACETAMINOPHEN 325 MG/1
650 TABLET ORAL
OUTPATIENT
Start: 2025-03-18

## 2024-03-19 RX ORDER — SODIUM CHLORIDE 0.9 % (FLUSH) 0.9 %
5-40 SYRINGE (ML) INJECTION PRN
Status: DISCONTINUED | OUTPATIENT
Start: 2024-03-19 | End: 2024-03-20 | Stop reason: HOSPADM

## 2024-03-19 RX ORDER — EPINEPHRINE 1 MG/ML
0.3 INJECTION, SOLUTION, CONCENTRATE INTRAVENOUS PRN
OUTPATIENT
Start: 2025-03-18

## 2024-03-19 RX ORDER — ZOLEDRONIC ACID 5 MG/100ML
5 INJECTION, SOLUTION INTRAVENOUS ONCE
Status: COMPLETED | OUTPATIENT
Start: 2024-03-19 | End: 2024-03-19

## 2024-03-19 RX ORDER — DIPHENHYDRAMINE HYDROCHLORIDE 50 MG/ML
50 INJECTION INTRAMUSCULAR; INTRAVENOUS
OUTPATIENT
Start: 2025-03-18

## 2024-03-19 RX ORDER — ALBUTEROL SULFATE 90 UG/1
4 AEROSOL, METERED RESPIRATORY (INHALATION) PRN
OUTPATIENT
Start: 2025-03-18

## 2024-03-19 RX ADMIN — SODIUM CHLORIDE, PRESERVATIVE FREE 10 ML: 5 INJECTION INTRAVENOUS at 14:06

## 2024-03-19 RX ADMIN — SODIUM CHLORIDE, PRESERVATIVE FREE 10 ML: 5 INJECTION INTRAVENOUS at 14:35

## 2024-03-19 RX ADMIN — SODIUM CHLORIDE 20 ML/HR: 9 INJECTION, SOLUTION INTRAVENOUS at 14:10

## 2024-03-19 RX ADMIN — ZOLEDRONIC ACID 5 MG: 5 INJECTION, SOLUTION INTRAVENOUS at 14:14

## 2024-08-12 ENCOUNTER — HOSPITAL ENCOUNTER (EMERGENCY)
Age: 84
Discharge: HOME | End: 2024-08-12
Payer: MEDICARE

## 2024-08-12 ENCOUNTER — APPOINTMENT (OUTPATIENT)
Dept: CT IMAGING | Age: 84
End: 2024-08-12
Payer: MEDICARE

## 2024-08-12 ENCOUNTER — APPOINTMENT (OUTPATIENT)
Dept: GENERAL RADIOLOGY | Age: 84
End: 2024-08-12
Payer: MEDICARE

## 2024-08-12 VITALS
RESPIRATION RATE: 16 BRPM | BODY MASS INDEX: 26.47 KG/M2 | WEIGHT: 164 LBS | TEMPERATURE: 98.3 F | OXYGEN SATURATION: 98 % | SYSTOLIC BLOOD PRESSURE: 132 MMHG | DIASTOLIC BLOOD PRESSURE: 64 MMHG | HEART RATE: 66 BPM

## 2024-08-12 DIAGNOSIS — R42 DIZZINESS: Primary | ICD-10-CM

## 2024-08-12 LAB
ANION GAP SERPL CALCULATED.3IONS-SCNC: 11 MMOL/L (ref 7–16)
BILIRUB UR QL STRIP: NEGATIVE
BNP SERPL-MCNC: 590 PG/ML (ref 0–450)
BUN SERPL-MCNC: 16 MG/DL (ref 6–23)
CALCIUM SERPL-MCNC: 9.2 MG/DL (ref 8.6–10.2)
CHLORIDE SERPL-SCNC: 103 MMOL/L (ref 98–107)
CLARITY UR: CLEAR
CO2 SERPL-SCNC: 25 MMOL/L (ref 22–29)
COLOR UR: YELLOW
CREAT SERPL-MCNC: 0.7 MG/DL (ref 0.5–1)
ERYTHROCYTE [DISTWIDTH] IN BLOOD BY AUTOMATED COUNT: 14.7 % (ref 11.5–15)
GFR, ESTIMATED: 81 ML/MIN/1.73M2
GLUCOSE SERPL-MCNC: 102 MG/DL (ref 74–99)
GLUCOSE UR STRIP-MCNC: NEGATIVE MG/DL
HCT VFR BLD AUTO: 42.4 % (ref 34–48)
HGB BLD-MCNC: 13.7 G/DL (ref 11.5–15.5)
HGB UR QL STRIP.AUTO: ABNORMAL
INR PPP: 1.5
KETONES UR STRIP-MCNC: NEGATIVE MG/DL
LACTATE BLDV-SCNC: 1.4 MMOL/L (ref 0.5–2.2)
LEUKOCYTE ESTERASE UR QL STRIP: NEGATIVE
MCH RBC QN AUTO: 29.7 PG (ref 26–35)
MCHC RBC AUTO-ENTMCNC: 32.3 G/DL (ref 32–34.5)
MCV RBC AUTO: 92 FL (ref 80–99.9)
NITRITE UR QL STRIP: NEGATIVE
PH UR STRIP: 6.5 [PH] (ref 5–9)
PLATELET # BLD AUTO: 115 K/UL (ref 130–450)
PLATELET CONFIRMATION: NORMAL
PMV BLD AUTO: 10.9 FL (ref 7–12)
POTASSIUM SERPL-SCNC: 4.5 MMOL/L (ref 3.5–5)
PROT UR STRIP-MCNC: NEGATIVE MG/DL
PROTHROMBIN TIME: 16.9 SEC (ref 9.3–12.4)
RBC # BLD AUTO: 4.61 M/UL (ref 3.5–5.5)
RBC #/AREA URNS HPF: ABNORMAL /HPF
SARS-COV-2 RDRP RESP QL NAA+PROBE: NOT DETECTED
SODIUM SERPL-SCNC: 139 MMOL/L (ref 132–146)
SP GR UR STRIP: <1.005 (ref 1–1.03)
SPECIMEN DESCRIPTION: NORMAL
TROPONIN I SERPL HS-MCNC: 15 NG/L (ref 0–9)
TROPONIN I SERPL HS-MCNC: 15 NG/L (ref 0–9)
UROBILINOGEN UR STRIP-ACNC: 0.2 EU/DL (ref 0–1)
WBC #/AREA URNS HPF: ABNORMAL /HPF
WBC OTHER # BLD: 5.2 K/UL (ref 4.5–11.5)

## 2024-08-12 PROCEDURE — 83880 ASSAY OF NATRIURETIC PEPTIDE: CPT

## 2024-08-12 PROCEDURE — 80048 BASIC METABOLIC PNL TOTAL CA: CPT

## 2024-08-12 PROCEDURE — 81001 URINALYSIS AUTO W/SCOPE: CPT

## 2024-08-12 PROCEDURE — 85610 PROTHROMBIN TIME: CPT

## 2024-08-12 PROCEDURE — 87635 SARS-COV-2 COVID-19 AMP PRB: CPT

## 2024-08-12 PROCEDURE — 70450 CT HEAD/BRAIN W/O DYE: CPT

## 2024-08-12 PROCEDURE — 84484 ASSAY OF TROPONIN QUANT: CPT

## 2024-08-12 PROCEDURE — 93005 ELECTROCARDIOGRAM TRACING: CPT | Performed by: EMERGENCY MEDICINE

## 2024-08-12 PROCEDURE — 99285 EMERGENCY DEPT VISIT HI MDM: CPT

## 2024-08-12 PROCEDURE — 71045 X-RAY EXAM CHEST 1 VIEW: CPT

## 2024-08-12 PROCEDURE — 85027 COMPLETE CBC AUTOMATED: CPT

## 2024-08-12 PROCEDURE — 83605 ASSAY OF LACTIC ACID: CPT

## 2024-08-12 ASSESSMENT — PAIN - FUNCTIONAL ASSESSMENT: PAIN_FUNCTIONAL_ASSESSMENT: NONE - DENIES PAIN

## 2024-08-12 ASSESSMENT — LIFESTYLE VARIABLES
HOW MANY STANDARD DRINKS CONTAINING ALCOHOL DO YOU HAVE ON A TYPICAL DAY: PATIENT DOES NOT DRINK
HOW OFTEN DO YOU HAVE A DRINK CONTAINING ALCOHOL: NEVER

## 2024-08-12 ASSESSMENT — PAIN SCALES - GENERAL: PAINLEVEL_OUTOF10: 0

## 2024-08-12 NOTE — DISCHARGE INSTRUCTIONS
If your symptoms return please return to the ER.  Please follow-up with your regular doctor in 1 week.

## 2024-08-12 NOTE — ED PROVIDER NOTES
Independent [facility]INT Visit.     [facility] EMERGENCY DEPARTMENT  EMERGENCY DEPARTMENT ENCOUNTER      Pt Name: Shae Chavez  MRN: [id number]  Birthdate 1940  Date of evaluation: 8/12/2024  Provider: DELIA Beltran - CNP  PCP: Salvador Pritchett MD  Note Started: 9:58 AM EDT 8/12/24    CHIEF COMPLAINT       Chief Complaint   Patient presents with    Dizziness     Dizziness started yesterday morning,  not feeling well the past few days       HISTORY OF PRESENT ILLNESS: 1 or more Elements   History From: Patient, patient's grandson  Limitations to history : Language patient speaks English but does require some translation    Shae Chavez is a 84 y.o. female who has a past medical history of arthritis, atrial fibrillation, MI, hyperlipidemia, mitral valve replacement, pacemaker placement presents to the emergency department with dizziness for about 10 minutes that started yesterday morning.  States that this morning she woke up and felt generally unwell.  Believes that she may have had a fever last night but did not check her temperature.  Denies any cough, chest ingestion, headache, nausea, vomiting, diarrhea, abdominal pain, chest pain, shortness of breath.  States that she feels in her usual state of health at this time and is denying any dizziness    Nursing Notes were all reviewed and agreed with or any disagreements were addressed in the HPI.    REVIEW OF SYSTEMS :    Positives and Pertinent negatives as per HPI.     PAST MEDICAL HISTORY/Chronic Conditions Affecting Care    has a past medical history of Arthritis, Atrial fibrillation (HCC), Heart attack (HCC) (01/01/2006), and Hyperlipidemia.     SURGICAL HISTORY     Past Surgical History:   Procedure Laterality Date    APPENDECTOMY  2009    ARM DEBRIDEMENT  1998    HYSTERECTOMY (CERVIX STATUS UNKNOWN)  9/26/79    tv    MITRAL VALVE REPLACEMENT  2008    PACEMAKER PLACEMENT  2008    PGR with Dr Donnie William device

## 2024-08-13 LAB
EKG ATRIAL RATE: 37 BPM
EKG P AXIS: -67 DEGREES
EKG P-R INTERVAL: 262 MS
EKG Q-T INTERVAL: 422 MS
EKG QRS DURATION: 92 MS
EKG QTC CALCULATION (BAZETT): 422 MS
EKG R AXIS: 6 DEGREES
EKG T AXIS: 27 DEGREES
EKG VENTRICULAR RATE: 60 BPM

## 2024-08-13 PROCEDURE — 93010 ELECTROCARDIOGRAM REPORT: CPT | Performed by: INTERNAL MEDICINE

## 2025-03-12 RX ORDER — SODIUM CHLORIDE 9 MG/ML
INJECTION, SOLUTION INTRAVENOUS CONTINUOUS
OUTPATIENT
Start: 2025-03-16

## 2025-03-12 RX ORDER — ALBUTEROL SULFATE 90 UG/1
4 INHALANT RESPIRATORY (INHALATION) PRN
OUTPATIENT
Start: 2025-03-16

## 2025-03-12 RX ORDER — ZOLEDRONIC ACID 0.05 MG/ML
5 INJECTION, SOLUTION INTRAVENOUS ONCE
OUTPATIENT
Start: 2025-03-16 | End: 2025-03-16

## 2025-03-12 RX ORDER — DIPHENHYDRAMINE HYDROCHLORIDE 50 MG/ML
50 INJECTION INTRAMUSCULAR; INTRAVENOUS
OUTPATIENT
Start: 2025-03-16

## 2025-03-12 RX ORDER — ACETAMINOPHEN 325 MG/1
650 TABLET ORAL
OUTPATIENT
Start: 2025-03-16

## 2025-03-12 RX ORDER — EPINEPHRINE 1 MG/ML
0.3 INJECTION, SOLUTION, CONCENTRATE INTRAVENOUS PRN
OUTPATIENT
Start: 2025-03-16

## 2025-03-12 RX ORDER — SODIUM CHLORIDE 0.9 % (FLUSH) 0.9 %
5-40 SYRINGE (ML) INJECTION PRN
OUTPATIENT
Start: 2025-03-16

## 2025-03-12 RX ORDER — HYDROCORTISONE SODIUM SUCCINATE 100 MG/2ML
100 INJECTION INTRAMUSCULAR; INTRAVENOUS
OUTPATIENT
Start: 2025-03-16

## 2025-03-12 RX ORDER — SODIUM CHLORIDE 9 MG/ML
5-250 INJECTION, SOLUTION INTRAVENOUS PRN
OUTPATIENT
Start: 2025-03-16

## 2025-03-18 NOTE — PROGRESS NOTES
Received a referral for Reclast along with labs. Called to Pharmacist Myesha to have creatinine clearance calculated. Patient is 85 years old, height 5'3\", weight 158.6 lbs , gender female, creatinine level 0.65. Pharmacist did calculation and I was told creatinine clearance is 60 ml/min.

## 2025-03-19 ENCOUNTER — CLINICAL DOCUMENTATION (OUTPATIENT)
Dept: INFUSION THERAPY | Age: 85
End: 2025-03-19

## 2025-03-19 NOTE — PROGRESS NOTES
Left a message for pt that we needed to cancel her appt today due to we still don't have an Auth and to please call back so we can reschedule your appt

## 2025-03-20 RX ORDER — ONDANSETRON 2 MG/ML
8 INJECTION INTRAMUSCULAR; INTRAVENOUS
OUTPATIENT
Start: 2025-03-21

## 2025-03-20 RX ORDER — DIPHENHYDRAMINE HYDROCHLORIDE 50 MG/ML
50 INJECTION, SOLUTION INTRAMUSCULAR; INTRAVENOUS
OUTPATIENT
Start: 2025-03-21

## 2025-03-20 RX ORDER — ZOLEDRONIC ACID 0.05 MG/ML
5 INJECTION, SOLUTION INTRAVENOUS ONCE
OUTPATIENT
Start: 2025-03-21 | End: 2025-03-21

## 2025-03-20 RX ORDER — ACETAMINOPHEN 325 MG/1
650 TABLET ORAL
OUTPATIENT
Start: 2025-03-21

## 2025-03-20 RX ORDER — HEPARIN 100 UNIT/ML
500 SYRINGE INTRAVENOUS PRN
OUTPATIENT
Start: 2025-03-21

## 2025-03-20 RX ORDER — SODIUM CHLORIDE 9 MG/ML
5-250 INJECTION, SOLUTION INTRAVENOUS PRN
OUTPATIENT
Start: 2025-03-21

## 2025-03-20 RX ORDER — SODIUM CHLORIDE 0.9 % (FLUSH) 0.9 %
5-40 SYRINGE (ML) INJECTION PRN
OUTPATIENT
Start: 2025-03-21

## 2025-03-20 RX ORDER — SODIUM CHLORIDE 9 MG/ML
INJECTION, SOLUTION INTRAVENOUS CONTINUOUS
OUTPATIENT
Start: 2025-03-21

## 2025-03-20 RX ORDER — ALBUTEROL SULFATE 90 UG/1
4 INHALANT RESPIRATORY (INHALATION) PRN
OUTPATIENT
Start: 2025-03-21

## 2025-03-20 RX ORDER — HYDROCORTISONE SODIUM SUCCINATE 100 MG/2ML
100 INJECTION INTRAMUSCULAR; INTRAVENOUS
OUTPATIENT
Start: 2025-03-21

## 2025-03-20 RX ORDER — EPINEPHRINE 1 MG/ML
0.3 INJECTION, SOLUTION, CONCENTRATE INTRAVENOUS PRN
OUTPATIENT
Start: 2025-03-21

## 2025-03-25 NOTE — PROGRESS NOTES
Called to Dr. Pritchett's office to check for current labs patient had done and office to fax spoke with Kenzie

## 2025-03-31 NOTE — PROGRESS NOTES
Received a referral for Reclast along with labs. Called to Pharmacist Sabina to have creatinine clearance calculated. Patient is 85 years old, height 5'3\", weight 158.6 lb , gender female, creatinine level 0.70 mg/dL. Pharmacist did calculation and I was told creatinine clearance is 48 ml/min.

## 2025-04-01 ENCOUNTER — HOSPITAL ENCOUNTER (OUTPATIENT)
Dept: INFUSION THERAPY | Age: 85
Setting detail: INFUSION SERIES
Discharge: HOME | End: 2025-04-01
Payer: MEDICARE

## 2025-04-01 VITALS
OXYGEN SATURATION: 95 % | WEIGHT: 160 LBS | TEMPERATURE: 97.6 F | BODY MASS INDEX: 25.71 KG/M2 | SYSTOLIC BLOOD PRESSURE: 115 MMHG | RESPIRATION RATE: 16 BRPM | HEIGHT: 66 IN | DIASTOLIC BLOOD PRESSURE: 63 MMHG | HEART RATE: 59 BPM

## 2025-04-01 DIAGNOSIS — M81.0 AGE-RELATED OSTEOPOROSIS WITHOUT CURRENT PATHOLOGICAL FRACTURE: Primary | ICD-10-CM

## 2025-04-01 PROCEDURE — 2500000003 HC RX 250 WO HCPCS: Performed by: INTERNAL MEDICINE

## 2025-04-01 PROCEDURE — 96365 THER/PROPH/DIAG IV INF INIT: CPT

## 2025-04-01 PROCEDURE — 6360000002 HC RX W HCPCS: Performed by: INTERNAL MEDICINE

## 2025-04-01 RX ORDER — ALBUTEROL SULFATE 90 UG/1
4 INHALANT RESPIRATORY (INHALATION) PRN
OUTPATIENT
Start: 2026-03-31

## 2025-04-01 RX ORDER — SODIUM CHLORIDE 9 MG/ML
INJECTION, SOLUTION INTRAVENOUS CONTINUOUS
OUTPATIENT
Start: 2026-03-31

## 2025-04-01 RX ORDER — ZOLEDRONIC ACID 0.05 MG/ML
5 INJECTION, SOLUTION INTRAVENOUS ONCE
Status: COMPLETED | OUTPATIENT
Start: 2025-04-01 | End: 2025-04-01

## 2025-04-01 RX ORDER — EPINEPHRINE 1 MG/ML
0.3 INJECTION, SOLUTION, CONCENTRATE INTRAVENOUS PRN
OUTPATIENT
Start: 2026-03-31

## 2025-04-01 RX ORDER — SODIUM CHLORIDE 0.9 % (FLUSH) 0.9 %
5-40 SYRINGE (ML) INJECTION PRN
OUTPATIENT
Start: 2026-03-31

## 2025-04-01 RX ORDER — ACETAMINOPHEN 325 MG/1
650 TABLET ORAL
OUTPATIENT
Start: 2026-03-31

## 2025-04-01 RX ORDER — HEPARIN 100 UNIT/ML
500 SYRINGE INTRAVENOUS PRN
Status: DISCONTINUED | OUTPATIENT
Start: 2025-04-01 | End: 2025-04-02 | Stop reason: HOSPADM

## 2025-04-01 RX ORDER — HEPARIN 100 UNIT/ML
500 SYRINGE INTRAVENOUS PRN
OUTPATIENT
Start: 2026-03-31

## 2025-04-01 RX ORDER — ZOLEDRONIC ACID 0.05 MG/ML
5 INJECTION, SOLUTION INTRAVENOUS ONCE
Status: CANCELLED | OUTPATIENT
Start: 2026-03-31 | End: 2026-03-31

## 2025-04-01 RX ORDER — DIPHENHYDRAMINE HYDROCHLORIDE 50 MG/ML
50 INJECTION, SOLUTION INTRAMUSCULAR; INTRAVENOUS
OUTPATIENT
Start: 2026-03-31

## 2025-04-01 RX ORDER — SODIUM CHLORIDE 9 MG/ML
5-250 INJECTION, SOLUTION INTRAVENOUS PRN
OUTPATIENT
Start: 2026-03-31

## 2025-04-01 RX ORDER — ONDANSETRON 2 MG/ML
8 INJECTION INTRAMUSCULAR; INTRAVENOUS
OUTPATIENT
Start: 2026-03-31

## 2025-04-01 RX ORDER — HYDROCORTISONE SODIUM SUCCINATE 100 MG/2ML
100 INJECTION INTRAMUSCULAR; INTRAVENOUS
OUTPATIENT
Start: 2026-03-31

## 2025-04-01 RX ORDER — SODIUM CHLORIDE 0.9 % (FLUSH) 0.9 %
5-40 SYRINGE (ML) INJECTION PRN
Status: DISCONTINUED | OUTPATIENT
Start: 2025-04-01 | End: 2025-04-02 | Stop reason: HOSPADM

## 2025-04-01 RX ADMIN — SODIUM CHLORIDE, PRESERVATIVE FREE 10 ML: 5 INJECTION INTRAVENOUS at 13:27

## 2025-04-01 RX ADMIN — ZOLEDRONIC ACID 5 MG: 5 INJECTION INTRAVENOUS at 13:29

## 2025-04-01 RX ADMIN — SODIUM CHLORIDE, PRESERVATIVE FREE 20 ML: 5 INJECTION INTRAVENOUS at 13:46

## 2025-04-01 NOTE — PROGRESS NOTES
Before infusion patient declined any infections, open wounds, or change in medical condition. Tolerated reclast infusion well.  Reviewed therapy plan, offered education material and/or discharge material, reviewed medication information and signs and symptoms  and educated on possible side effects, verbalizes good knowledge of current plan patient verbalizes understanding, and has no signs or symptoms to report at this time. Patient discharged. Patient alert and oriented x3.   No distress noted.   Vital signs stable.   Patient denies any new or worsening pain.  Patient denies any needs.  All questions answered.  Next appointment scheduled. Declines copy of AVS. Instructed on importance, patient declined 30 min observation after infusion completed.

## 2025-04-21 ENCOUNTER — TELEPHONE (OUTPATIENT)
Dept: NON INVASIVE DIAGNOSTICS | Age: 85
End: 2025-04-21

## 2025-04-21 NOTE — TELEPHONE ENCOUNTER
Dr. Fields reached out to Dr. Garcia to do a gen change on this patient. Dr. Garcia would like for the patient to be seen by himself or a NP so we can schedule the gen change. I called Dr. Fields's office and notified his staff that we need a referral and most recent cardiac records. I advised the patient has to have a recent ECHO and EKG per Dr. Garcia to schedule the gen change. The staff member said she will let the doctor know. I advised her once we get the records, we can put the patient on the schedule and she verbalized understanding.     Electronically signed by Teressa Bhatt MA on 4/21/2025 at 1:53 PM

## 2025-04-22 ENCOUNTER — TELEPHONE (OUTPATIENT)
Dept: NON INVASIVE DIAGNOSTICS | Age: 85
End: 2025-04-22

## 2025-04-22 ENCOUNTER — HOSPITAL ENCOUNTER (OUTPATIENT)
Dept: CARDIOLOGY | Age: 85
Discharge: HOME | End: 2025-04-24
Attending: INTERNAL MEDICINE
Payer: MEDICARE

## 2025-04-22 VITALS
BODY MASS INDEX: 26.66 KG/M2 | SYSTOLIC BLOOD PRESSURE: 116 MMHG | HEIGHT: 65 IN | WEIGHT: 160 LBS | DIASTOLIC BLOOD PRESSURE: 62 MMHG

## 2025-04-22 DIAGNOSIS — R94.31 ABNORMAL EKG: ICD-10-CM

## 2025-04-22 DIAGNOSIS — R94.31 ABNORMAL EKG: Primary | ICD-10-CM

## 2025-04-22 PROCEDURE — 93306 TTE W/DOPPLER COMPLETE: CPT

## 2025-04-22 NOTE — TELEPHONE ENCOUNTER
used for this call  Agent name: Hector  Session Code: [id number]    Patient requested we speak with her grandson to set up appointments for the ECHO, OV, and Gen change. The patient was provided with my direct number to call and arrange all appointments.     Electronically signed by Teressa Bhatt MA on 4/22/2025 at 8:21 AM      Layton (grandson) called and was provided with the information for the ECHO appointment today at 2:30 PM, office visit with Dr. Garcia on 4/24/2025 at 2:45 PM, and gen change on 5/15/2025 at 9:30 AM if needed. The grandson verbalized understanding.     Electronically signed by Teressa Bhatt MA on 4/22/2025 at 8:26 AM

## 2025-04-23 LAB
ECHO AO ASC DIAM: 3.1 CM
ECHO AO ASCENDING AORTA INDEX: 1.72 CM/M2
ECHO AR MAX VEL PISA: 3.8 M/S
ECHO AV AREA PEAK VELOCITY: 2.8 CM2
ECHO AV AREA VTI: 3.4 CM2
ECHO AV AREA/BSA PEAK VELOCITY: 1.6 CM2/M2
ECHO AV AREA/BSA VTI: 1.9 CM2/M2
ECHO AV CUSP MM: 2 CM
ECHO AV MEAN GRADIENT: 3 MMHG
ECHO AV MEAN VELOCITY: 0.8 M/S
ECHO AV PEAK GRADIENT: 5 MMHG
ECHO AV PEAK VELOCITY: 1.1 M/S
ECHO AV REGURGITANT PHT: 556.7 MS
ECHO AV VELOCITY RATIO: 0.91
ECHO AV VTI: 17.3 CM
ECHO BSA: 1.82 M2
ECHO EST RA PRESSURE: 3 MMHG
ECHO LA DIAMETER INDEX: 2.39 CM/M2
ECHO LA DIAMETER: 4.3 CM
ECHO LA VOL A-L A2C: 76 ML (ref 22–52)
ECHO LA VOL A-L A4C: 59 ML (ref 22–52)
ECHO LA VOL MOD A2C: 73 ML (ref 22–52)
ECHO LA VOL MOD A4C: 56 ML (ref 22–52)
ECHO LA VOLUME AREA LENGTH: 67 ML
ECHO LA VOLUME INDEX A-L A2C: 42 ML/M2 (ref 16–34)
ECHO LA VOLUME INDEX A-L A4C: 33 ML/M2 (ref 16–34)
ECHO LA VOLUME INDEX AREA LENGTH: 37 ML/M2 (ref 16–34)
ECHO LA VOLUME INDEX MOD A2C: 41 ML/M2 (ref 16–34)
ECHO LA VOLUME INDEX MOD A4C: 31 ML/M2 (ref 16–34)
ECHO LV EJECTION FRACTION 3D: 60 %
ECHO LV FRACTIONAL SHORTENING: 31 % (ref 28–44)
ECHO LV INTERNAL DIMENSION DIASTOLE INDEX: 2.33 CM/M2
ECHO LV INTERNAL DIMENSION DIASTOLIC: 4.2 CM (ref 3.9–5.3)
ECHO LV INTERNAL DIMENSION SYSTOLIC INDEX: 1.61 CM/M2
ECHO LV INTERNAL DIMENSION SYSTOLIC: 2.9 CM
ECHO LV ISOVOLUMETRIC RELAXATION TIME (IVRT): 71.5 MS
ECHO LV IVSD: 0.9 CM (ref 0.6–0.9)
ECHO LV IVSS: 1.3 CM
ECHO LV MASS 2D: 109.8 G (ref 67–162)
ECHO LV MASS INDEX 2D: 61 G/M2 (ref 43–95)
ECHO LV POSTERIOR WALL DIASTOLIC: 0.8 CM (ref 0.6–0.9)
ECHO LV POSTERIOR WALL SYSTOLIC: 1.3 CM
ECHO LV RELATIVE WALL THICKNESS RATIO: 0.38
ECHO LVOT AREA: 3.1 CM2
ECHO LVOT AV VTI INDEX: 1.07
ECHO LVOT DIAM: 2 CM
ECHO LVOT MEAN GRADIENT: 2 MMHG
ECHO LVOT PEAK GRADIENT: 4 MMHG
ECHO LVOT PEAK VELOCITY: 1 M/S
ECHO LVOT STROKE VOLUME INDEX: 32.3 ML/M2
ECHO LVOT SV: 58.1 ML
ECHO LVOT VTI: 18.5 CM
ECHO MV AREA PHT: 2.8 CM2
ECHO MV AREA VTI: 1.5 CM2
ECHO MV E DECELERATION TIME (DT): 257.5 MS
ECHO MV LVOT VTI INDEX: 2.15
ECHO MV MAX VELOCITY: 1.6 M/S
ECHO MV MEAN GRADIENT: 4 MMHG
ECHO MV MEAN VELOCITY: 0.9 M/S
ECHO MV PEAK GRADIENT: 11 MMHG
ECHO MV PRESSURE HALF TIME (PHT): 77.4 MS
ECHO MV VTI: 39.7 CM
ECHO PULMONARY ARTERY END DIASTOLIC PRESSURE: 12 MMHG
ECHO PV MAX VELOCITY: 0.8 M/S
ECHO PV MEAN GRADIENT: 1 MMHG
ECHO PV MEAN VELOCITY: 0.5 M/S
ECHO PV PEAK GRADIENT: 3 MMHG
ECHO PV REGURGITANT MAX VELOCITY: 1.7 M/S
ECHO PV VTI: 11.3 CM
ECHO PVEIN PEAK D VELOCITY: 0.5 M/S
ECHO PVEIN PEAK S VELOCITY: 0.2 M/S
ECHO PVEIN S/D RATIO: 0.4
ECHO RIGHT VENTRICULAR SYSTOLIC PRESSURE (RVSP): 25 MMHG
ECHO RV INTERNAL DIMENSION: 2.3 CM
ECHO TV REGURGITANT MAX VELOCITY: 2.36 M/S
ECHO TV REGURGITANT PEAK GRADIENT: 22 MMHG

## 2025-04-23 NOTE — TELEPHONE ENCOUNTER
Per Irmaity/Lala, no authorization required for CPT 32512.  Transaction ID: [id number], Transaction Date:  2025-04-23

## 2025-04-23 NOTE — PROGRESS NOTES
disease   - Status post PCI in 2006.    5. Valvular heart disease  - Severe mitral regurgitation status post mitral valve repair in 2008.  - TTE 4/22/25: mild to moderate AR, mild MR and mild TR    6. Osteoarthritis     7. Osteoporosis    Recommendations:  Will schedule pacemaker pulse generator change.  Hold Eliquis 48 hours before the procedure.  Follow up with Dr. Fields after the procedure. Encouraged the patient to call the office for any questions or concerns.    I have spent a total of 60 minutes with the patient and the family reviewing the above stated recommendations.  And a total of >50% of that time involved face-to-face time providing counseling and or coordination of care with the other providers, preparation for the clinic visit, reviewing records/tests, counseling/education of the patient, ordering medications/tests/procedures, coordinating care, and documenting clinical information in the EHR.     Thank you for allowing me to participate in your patient's care.  Please call me if there are any questions or concerns.      Margaret Garcia MD  Cardiac Electrophysiology  [facility]  [facility] [place]: [facility]  3:19 PM  4/24/2025

## 2025-04-24 ENCOUNTER — OFFICE VISIT (OUTPATIENT)
Dept: NON INVASIVE DIAGNOSTICS | Age: 85
End: 2025-04-24

## 2025-04-24 VITALS
BODY MASS INDEX: 30.55 KG/M2 | TEMPERATURE: 97.5 F | WEIGHT: 161.8 LBS | HEART RATE: 64 BPM | OXYGEN SATURATION: 93 % | RESPIRATION RATE: 16 BRPM | HEIGHT: 61 IN | SYSTOLIC BLOOD PRESSURE: 118 MMHG | DIASTOLIC BLOOD PRESSURE: 72 MMHG

## 2025-04-24 DIAGNOSIS — Z95.0 PRESENCE OF PERMANENT CARDIAC PACEMAKER: Chronic | ICD-10-CM

## 2025-04-24 DIAGNOSIS — I48.0 PAROXYSMAL ATRIAL FIBRILLATION (HCC): Primary | ICD-10-CM

## 2025-04-28 ENCOUNTER — PREP FOR PROCEDURE (OUTPATIENT)
Dept: NON INVASIVE DIAGNOSTICS | Age: 85
End: 2025-04-28

## 2025-04-28 ENCOUNTER — TELEPHONE (OUTPATIENT)
Dept: NON INVASIVE DIAGNOSTICS | Age: 85
End: 2025-04-28

## 2025-04-28 DIAGNOSIS — I48.0 PAROXYSMAL ATRIAL FIBRILLATION (HCC): Primary | ICD-10-CM

## 2025-04-28 RX ORDER — SODIUM CHLORIDE 9 MG/ML
INJECTION, SOLUTION INTRAVENOUS PRN
Status: CANCELLED | OUTPATIENT
Start: 2025-04-28

## 2025-04-28 RX ORDER — SODIUM CHLORIDE 0.9 % (FLUSH) 0.9 %
5-40 SYRINGE (ML) INJECTION PRN
Status: CANCELLED | OUTPATIENT
Start: 2025-04-28

## 2025-04-28 RX ORDER — SODIUM CHLORIDE 0.9 % (FLUSH) 0.9 %
5-40 SYRINGE (ML) INJECTION EVERY 12 HOURS SCHEDULED
Status: CANCELLED | OUTPATIENT
Start: 2025-04-28

## 2025-04-28 NOTE — TELEPHONE ENCOUNTER
I spoke with Barbara in Dr. Fields's office about procedure follow up appointments. I advised Dr. Garcia is doing a PGR for Dr. Fields and Barbara told me to go ahead and schedule the normal procedure follow up appointments with Dr. Garcia.     Electronically signed by Teressa Bhatt MA on 4/28/2025 at 3:29 PM

## 2025-05-14 PROCEDURE — 33228 REMV&REPLC PM GEN DUAL LEAD: CPT | Performed by: INTERNAL MEDICINE

## 2025-05-14 NOTE — H&P
[facility]- [facility]  Consultation Report  PATIENT: Shae Chavez  MEDICAL RECORD NUMBER: [id number]  DATE OF SERVICE:  5/15/2025  ATTENDING ELECTROPHYSIOLOGIST: Margaret Garcia MD  REFERRING PHYSICIAN: No ref. provider found and Salvador Pritchett MD  CHIEF COMPLAINT: Pacemaker in situ    HPI: This is a 85 y.o. female with a history of   Patient Active Problem List   Diagnosis    Chest discomfort    History of mitral valve repair    History of mitral valve prolapse in adulthood    Sinus node dysfunction (HCC)    Presence of permanent cardiac pacemaker    Osteoporosis    Osteoarthritis    Cervicalgia    Dyspnea on effort    Tachycardia    Hypothyroidism    Senile osteoporosis    TIA (transient ischemic attack)    Anxiety and depression    Atrial fibrillation (HCC)    Middle cerebral artery stenosis    Vitamin D deficiency    History of atrial fibrillation    History of coronary artery stent placement    Acute ischemic stroke (HCC)    Atrial fibrillation with rapid ventricular response (HCC)   who presents to cardiac electrophysiology clinic for consultation of pacemaker in situ.   The patient has history of paroxysmal atrial fibrillation, sinus node dysfunction status post Abbott dual chamber pacemaker implantation in 2008, coronary artery disease status post PCI in 2006, severe mitral regurgitation status post mitral valve repair in 2008, osteoarthritis and osteoporosis. The patient has been followed with Dr. Fields and was recently found to to reach elective replacement of her pacemaker pulse generator. The patient reports feeling overall well and offers no complaints from a device POV. The device site looks well healed and free from infection or erosion.  The patient denies any chest pain, dyspnea, palpitations, dizziness, syncope, orthopnea or paroxysmal nocturnal dyspnea.     Patient Active Problem List    Diagnosis Date Noted    Tachycardia

## 2025-05-15 ENCOUNTER — APPOINTMENT (OUTPATIENT)
Dept: GENERAL RADIOLOGY | Age: 85
End: 2025-05-15
Attending: INTERNAL MEDICINE
Payer: MEDICARE

## 2025-05-15 ENCOUNTER — ANESTHESIA (OUTPATIENT)
Age: 85
End: 2025-05-15
Payer: MEDICARE

## 2025-05-15 ENCOUNTER — ANESTHESIA EVENT (OUTPATIENT)
Age: 85
End: 2025-05-15
Payer: MEDICARE

## 2025-05-15 ENCOUNTER — HOSPITAL ENCOUNTER (OUTPATIENT)
Age: 85
Setting detail: OUTPATIENT SURGERY
Discharge: HOME | End: 2025-05-15
Attending: INTERNAL MEDICINE | Admitting: INTERNAL MEDICINE
Payer: MEDICARE

## 2025-05-15 VITALS
RESPIRATION RATE: 16 BRPM | SYSTOLIC BLOOD PRESSURE: 147 MMHG | HEART RATE: 60 BPM | DIASTOLIC BLOOD PRESSURE: 82 MMHG | WEIGHT: 161.82 LBS | BODY MASS INDEX: 30.58 KG/M2 | TEMPERATURE: 97.2 F | OXYGEN SATURATION: 94 %

## 2025-05-15 DIAGNOSIS — Z45.02 ENCOUNTER DUE TO AICD AT END OF BATTERY LIFE: ICD-10-CM

## 2025-05-15 LAB
ANION GAP SERPL CALCULATED.3IONS-SCNC: 10 MMOL/L (ref 7–16)
BUN SERPL-MCNC: 15 MG/DL (ref 8–23)
CALCIUM SERPL-MCNC: 9.1 MG/DL (ref 8.8–10.2)
CHLORIDE SERPL-SCNC: 106 MMOL/L (ref 98–107)
CO2 SERPL-SCNC: 26 MMOL/L (ref 22–29)
CREAT SERPL-MCNC: 0.8 MG/DL (ref 0.5–1)
EKG ATRIAL RATE: 57 BPM
EKG Q-T INTERVAL: 502 MS
EKG QRS DURATION: 158 MS
EKG QTC CALCULATION (BAZETT): 502 MS
EKG R AXIS: 104 DEGREES
EKG T AXIS: -43 DEGREES
EKG VENTRICULAR RATE: 60 BPM
ERYTHROCYTE [DISTWIDTH] IN BLOOD BY AUTOMATED COUNT: 14.6 % (ref 11.5–15)
GFR, ESTIMATED: 72 ML/MIN/1.73M2
GLUCOSE SERPL-MCNC: 98 MG/DL (ref 74–99)
HCT VFR BLD AUTO: 43.8 % (ref 34–48)
HGB BLD-MCNC: 14.2 G/DL (ref 11.5–15.5)
MCH RBC QN AUTO: 30.5 PG (ref 26–35)
MCHC RBC AUTO-ENTMCNC: 32.4 G/DL (ref 32–34.5)
MCV RBC AUTO: 94.2 FL (ref 80–99.9)
PLATELET, FLUORESCENCE: 122 K/UL (ref 130–450)
PMV BLD AUTO: 11 FL (ref 7–12)
POTASSIUM SERPL-SCNC: 4 MMOL/L (ref 3.5–5.1)
RBC # BLD AUTO: 4.65 M/UL (ref 3.5–5.5)
SODIUM SERPL-SCNC: 143 MMOL/L (ref 136–145)
WBC OTHER # BLD: 5.6 K/UL (ref 4.5–11.5)

## 2025-05-15 PROCEDURE — C1785 PMKR, DUAL, RATE-RESP: HCPCS | Performed by: INTERNAL MEDICINE

## 2025-05-15 PROCEDURE — 2720000010 HC SURG SUPPLY STERILE: Performed by: INTERNAL MEDICINE

## 2025-05-15 PROCEDURE — C1889 IMPLANT/INSERT DEVICE, NOC: HCPCS | Performed by: INTERNAL MEDICINE

## 2025-05-15 PROCEDURE — 85027 COMPLETE CBC AUTOMATED: CPT

## 2025-05-15 PROCEDURE — 2709999900 HC NON-CHARGEABLE SUPPLY: Performed by: INTERNAL MEDICINE

## 2025-05-15 PROCEDURE — 80048 BASIC METABOLIC PNL TOTAL CA: CPT

## 2025-05-15 PROCEDURE — 6360000002 HC RX W HCPCS: Performed by: NURSE ANESTHETIST, CERTIFIED REGISTERED

## 2025-05-15 PROCEDURE — 3700000001 HC ADD 15 MINUTES (ANESTHESIA): Performed by: INTERNAL MEDICINE

## 2025-05-15 PROCEDURE — 7100000010 HC PHASE II RECOVERY - FIRST 15 MIN: Performed by: INTERNAL MEDICINE

## 2025-05-15 PROCEDURE — 2500000003 HC RX 250 WO HCPCS: Performed by: NURSE ANESTHETIST, CERTIFIED REGISTERED

## 2025-05-15 PROCEDURE — 6360000002 HC RX W HCPCS: Performed by: INTERNAL MEDICINE

## 2025-05-15 PROCEDURE — 33228 REMV&REPLC PM GEN DUAL LEAD: CPT | Performed by: INTERNAL MEDICINE

## 2025-05-15 PROCEDURE — 3700000000 HC ANESTHESIA ATTENDED CARE: Performed by: INTERNAL MEDICINE

## 2025-05-15 PROCEDURE — 2580000003 HC RX 258: Performed by: INTERNAL MEDICINE

## 2025-05-15 PROCEDURE — 71045 X-RAY EXAM CHEST 1 VIEW: CPT

## 2025-05-15 PROCEDURE — 7100000011 HC PHASE II RECOVERY - ADDTL 15 MIN: Performed by: INTERNAL MEDICINE

## 2025-05-15 DEVICE — ENVELOPE CMRM6122 ABSORB MED MR
Type: IMPLANTABLE DEVICE | Site: CHEST | Status: FUNCTIONAL
Brand: TYRX™

## 2025-05-15 DEVICE — PACEMAKER
Type: IMPLANTABLE DEVICE | Site: CHEST | Status: FUNCTIONAL
Brand: ACCOLADE™ MRI EL DR

## 2025-05-15 RX ORDER — ONDANSETRON 2 MG/ML
4 INJECTION INTRAMUSCULAR; INTRAVENOUS EVERY 6 HOURS PRN
Status: CANCELLED | OUTPATIENT
Start: 2025-05-15

## 2025-05-15 RX ORDER — SODIUM CHLORIDE 0.9 % (FLUSH) 0.9 %
5-40 SYRINGE (ML) INJECTION PRN
Status: CANCELLED | OUTPATIENT
Start: 2025-05-15

## 2025-05-15 RX ORDER — SODIUM CHLORIDE 0.9 % (FLUSH) 0.9 %
5-40 SYRINGE (ML) INJECTION EVERY 12 HOURS SCHEDULED
Status: DISCONTINUED | OUTPATIENT
Start: 2025-05-15 | End: 2025-05-19 | Stop reason: HOSPADM

## 2025-05-15 RX ORDER — DEXMEDETOMIDINE HYDROCHLORIDE 100 UG/ML
INJECTION, SOLUTION INTRAVENOUS
Status: DISCONTINUED | OUTPATIENT
Start: 2025-05-15 | End: 2025-05-15 | Stop reason: SDUPTHER

## 2025-05-15 RX ORDER — LIDOCAINE HYDROCHLORIDE 10 MG/ML
INJECTION, SOLUTION INFILTRATION; PERINEURAL PRN
Status: DISCONTINUED | OUTPATIENT
Start: 2025-05-15 | End: 2025-05-15 | Stop reason: HOSPADM

## 2025-05-15 RX ORDER — PROPOFOL 10 MG/ML
INJECTION, EMULSION INTRAVENOUS
Status: DISCONTINUED | OUTPATIENT
Start: 2025-05-15 | End: 2025-05-15 | Stop reason: SDUPTHER

## 2025-05-15 RX ORDER — FENTANYL CITRATE 50 UG/ML
INJECTION, SOLUTION INTRAMUSCULAR; INTRAVENOUS
Status: DISCONTINUED | OUTPATIENT
Start: 2025-05-15 | End: 2025-05-15 | Stop reason: SDUPTHER

## 2025-05-15 RX ORDER — SODIUM CHLORIDE 9 MG/ML
INJECTION, SOLUTION INTRAVENOUS PRN
Status: DISCONTINUED | OUTPATIENT
Start: 2025-05-15 | End: 2025-05-19 | Stop reason: HOSPADM

## 2025-05-15 RX ORDER — DIPHENHYDRAMINE HYDROCHLORIDE 50 MG/ML
25 INJECTION, SOLUTION INTRAMUSCULAR; INTRAVENOUS ONCE
Status: COMPLETED | OUTPATIENT
Start: 2025-05-15 | End: 2025-05-15

## 2025-05-15 RX ORDER — CEPHALEXIN 500 MG/1
500 CAPSULE ORAL 2 TIMES DAILY
Qty: 14 CAPSULE | Refills: 0 | Status: SHIPPED | OUTPATIENT
Start: 2025-05-15 | End: 2025-05-22

## 2025-05-15 RX ORDER — SODIUM CHLORIDE 9 MG/ML
INJECTION, SOLUTION INTRAVENOUS PRN
Status: CANCELLED | OUTPATIENT
Start: 2025-05-15

## 2025-05-15 RX ORDER — SODIUM CHLORIDE 0.9 % (FLUSH) 0.9 %
5-40 SYRINGE (ML) INJECTION PRN
Status: DISCONTINUED | OUTPATIENT
Start: 2025-05-15 | End: 2025-05-19 | Stop reason: HOSPADM

## 2025-05-15 RX ORDER — CEFAZOLIN SODIUM 1 G/3ML
INJECTION, POWDER, FOR SOLUTION INTRAMUSCULAR; INTRAVENOUS
Status: DISCONTINUED | OUTPATIENT
Start: 2025-05-15 | End: 2025-05-15 | Stop reason: SDUPTHER

## 2025-05-15 RX ORDER — SODIUM CHLORIDE 0.9 % (FLUSH) 0.9 %
5-40 SYRINGE (ML) INJECTION EVERY 12 HOURS SCHEDULED
Status: CANCELLED | OUTPATIENT
Start: 2025-05-15

## 2025-05-15 RX ORDER — VANCOMYCIN HYDROCHLORIDE 1 G/20ML
INJECTION, POWDER, LYOPHILIZED, FOR SOLUTION INTRAVENOUS
Status: DISCONTINUED | OUTPATIENT
Start: 2025-05-15 | End: 2025-05-15 | Stop reason: SDUPTHER

## 2025-05-15 RX ADMIN — VANCOMYCIN HYDROCHLORIDE 1000 MG: 1 INJECTION, POWDER, LYOPHILIZED, FOR SOLUTION INTRAVENOUS at 10:20

## 2025-05-15 RX ADMIN — FENTANYL CITRATE 25 MCG: 50 INJECTION, SOLUTION INTRAMUSCULAR; INTRAVENOUS at 10:25

## 2025-05-15 RX ADMIN — PROPOFOL 20 MCG/KG/MIN: 10 INJECTION, EMULSION INTRAVENOUS at 10:10

## 2025-05-15 RX ADMIN — DEXMEDETOMIDINE HCL 8 MCG: 100 INJECTION INTRAVENOUS at 10:10

## 2025-05-15 RX ADMIN — CEFAZOLIN 2 G: 1 INJECTION, POWDER, FOR SOLUTION INTRAMUSCULAR; INTRAVENOUS at 10:05

## 2025-05-15 RX ADMIN — PROPOFOL 10 MG: 10 INJECTION, EMULSION INTRAVENOUS at 10:27

## 2025-05-15 RX ADMIN — DIPHENHYDRAMINE HYDROCHLORIDE 25 MG: 50 INJECTION INTRAMUSCULAR; INTRAVENOUS at 11:17

## 2025-05-15 RX ADMIN — SODIUM CHLORIDE: 9 INJECTION, SOLUTION INTRAVENOUS at 10:02

## 2025-05-15 ASSESSMENT — ENCOUNTER SYMPTOMS: SHORTNESS OF BREATH: 1

## 2025-05-15 NOTE — ANESTHESIA POSTPROCEDURE EVALUATION
Department of Anesthesiology  Postprocedure Note    Patient: Shae Chavez  MRN: [id number]  YOB: 1940  Date of evaluation: 5/15/2025    Procedure Summary       Date: 05/15/25 Room / Location: [facility] [place] [facility] LAB [facility] [place] [facility]    Anesthesia Start: 1002 Anesthesia Stop: 1104    Procedure: Remove & replace PPM gen dual lead Diagnosis:       Encounter due to AICD at end of battery life      (Encounter due to AICD at end of battery life [Z45.02])    Providers: Margaret Garcia MD Responsible Provider: Nacho Stahl DO    Anesthesia Type: MAC ASA Status: 4            Anesthesia Type: No value filed.    Elodia Phase I: Elodia Score: 10    Elodia Phase II:      Anesthesia Post Evaluation    Patient location during evaluation: bedside  Patient participation: complete - patient cannot participate  Level of consciousness: awake and alert  Airway patency: patent  Nausea & Vomiting: no nausea and no vomiting  Cardiovascular status: blood pressure returned to baseline  Respiratory status: acceptable  Hydration status: euvolemic  Multimodal analgesia pain management approach    There were no known notable events for this encounter.

## 2025-05-15 NOTE — ANESTHESIA PRE PROCEDURE
Department of Anesthesiology  Preprocedure Note       Name:  Shae Chavez   Age:  85 y.o.  :  1940                                          MRN:  [id number]         Date:  5/15/2025      Surgeon: Surgeon(s):  Margaret Garcia MD    Procedure: Procedure(s):  Remove & replace PPM gen dual lead    Medications prior to admission:   Prior to Admission medications    Medication Sig Start Date End Date Taking? Authorizing Provider   magnesium oxide (MAG-OX) 400 (241.3 Mg) MG TABS tablet Take 1 tablet by mouth daily 22  Yes Salvador Pritchett MD   dofetilide (TIKOSYN) 250 MCG capsule Take 1 capsule by mouth every 12 hours 22  Yes Salvador Pritchett MD   Calcium Carb-Cholecalciferol (CALCIUM 600 + D PO) Take 1 tablet by mouth Daily with lunch   Yes ProviderJudit MD   nadolol (CORGARD) 20 MG tablet Take 0.5 tablets by mouth daily as needed (IRREGULAR HEART RHYTHMS)   Yes ProviderJudit MD   Coenzyme Q10 (CO Q-10) 100 MG CAPS Take 1 capsule by mouth nightly   Yes ProviderJudit MD   b complex vitamins capsule Take 1 capsule by mouth Daily with lunch   Yes ProviderJudit MD   zinc sulfate (ZINC-220) 220 (50 Zn) MG capsule Take 1 capsule by mouth Daily with lunch   Yes ProviderJudit MD   ezetimibe (ZETIA) 10 MG tablet Take 1 tablet by mouth nightly 10/21/21  Yes Key Pollard MD   rosuvastatin (CRESTOR) 5 MG tablet Take 1 tablet by mouth nightly 10/21/21  Yes Key Pollard MD   venlafaxine (EFFEXOR XR) 37.5 MG extended release capsule Take 1 capsule by mouth every evening 17  Yes ProviderJudit MD   vitamin D (CHOLECALCIFEROL) 1000 UNIT TABS tablet Take 1 tablet by mouth 2 times daily   Yes Judit Coleman MD   apixaban (ELIQUIS) 5 MG TABS tablet Take 1 tablet by mouth 2 times daily 10/21/21   Key Pollard MD       Current medications:    Current Facility-Administered Medications   Medication Dose Route Frequency Provider Last Rate Last Admin    sodium

## 2025-05-15 NOTE — DISCHARGE INSTRUCTIONS
[facility] Pacemaker Generator Change Patient Discharge Instructions      Medications:  Resume Eliquis on 5/18/25. Please resume your normal medication regimen as you are currently taking.  A prescription for an antibiotic has been sent to your pharmacy.    Follow-Up: You will be seen on Thursday 5/29/25 at 1:30 pm at the Device [facility]  for an incision check and brief pacemaker evaluation.      Incision Care:   You may have a white pressure dressing over your incision, if you do, remove it in 24 hours.  Aquacel dressing: Please leave the brown Aquacel dressing on for 7 days. Remove the AquaCel dressing on Thursday 5/22/25.  Steri strips: They look like white strips of tape over your incision. DO NOT REMOVE the steri strips, they will either fall off on their own or be removed at your follow up appointment.  Check the incision everyday: If you find any redness, warmth, swelling, drainage, fever greater than 100 degrees, or chills notify the office immediately at [phone].  No lotions, powders, or creams to incision.    Bathing: You may shower starting tomorrow just make sure no water runs directly over the incision for 7 days (once the dressing has been removed). No submerging in water (bath, swimming pool, hot tub..) until wound is completely healed.    Activity: You may continue regular daily activities tomorrow using caution for 1 week with the arm closest to your pacemaker. No extreme movements or stretching. No blows to the site or seatbelt/straps rubbing, cushion for your comfort.     ID Card: You will have a temporary ID card until a permanent card is sent to you by the device company. The permanent card will look like a ’s license or credit card and should arrive within 8 weeks. Carry your ID card with you at all times.     [facility]  [address] [place] [address], [place]  [address]  [phone]

## 2025-05-21 ENCOUNTER — TELEPHONE (OUTPATIENT)
Age: 85
End: 2025-05-21

## 2025-05-21 NOTE — TELEPHONE ENCOUNTER
I called Shae to see how she is doing since her PGR on 5/15/25.  She states she's doing fine & is aware that the aquacel dressing should be removed tomorrow and to leave the steri's intact. She denies any fevers, redness, bleeding, drainage, or swelling from PPM incision site. I reminded her of her follow up appt at the [facility] on 5/29/25 at 1:30 and she is aware. Her grandson has helped her with reading the materials and keeping her informed of what she is to do. She seems to have good understanding of above mentioned info. I feel we were able to communicate adequately.  She offers no complaints & is thankful for the phone call.

## 2025-05-27 LAB
EKG ATRIAL RATE: 57 BPM
EKG Q-T INTERVAL: 502 MS
EKG QRS DURATION: 158 MS
EKG QTC CALCULATION (BAZETT): 502 MS
EKG R AXIS: 104 DEGREES
EKG T AXIS: -43 DEGREES
EKG VENTRICULAR RATE: 60 BPM

## 2025-05-29 ENCOUNTER — CLINICAL SUPPORT (OUTPATIENT)
Dept: NON INVASIVE DIAGNOSTICS | Age: 85
End: 2025-05-29

## 2025-05-29 DIAGNOSIS — I48.0 PAROXYSMAL ATRIAL FIBRILLATION (HCC): Primary | ICD-10-CM

## 2025-05-29 NOTE — PROGRESS NOTES
See Wendy report      Lucila Berrios, RN, BSN  [facility] [place]  Device Clinic

## 2025-05-29 NOTE — PATIENT INSTRUCTIONS
Call if any signs or symptoms of infection [phone] ext: [phone]  Fevers, chills, redness, swelling or drainage.       Follow up with Dr. Fields

## (undated) DEVICE — AGENT HEMOSTATIC SURGIFLOW MATRIX KIT W/THROMBIN

## (undated) DEVICE — PLASMABLADE PS210-030S 3.0S LOCK: Brand: PLASMABLADE™

## (undated) DEVICE — PAD, DEFIB, ADULT, RADIOTRAN, PHYSIO, LO: Brand: MEDLINE